# Patient Record
Sex: MALE | Race: WHITE | NOT HISPANIC OR LATINO | Employment: OTHER | URBAN - METROPOLITAN AREA
[De-identification: names, ages, dates, MRNs, and addresses within clinical notes are randomized per-mention and may not be internally consistent; named-entity substitution may affect disease eponyms.]

---

## 2017-06-27 ENCOUNTER — HOSPITAL ENCOUNTER (OUTPATIENT)
Dept: RADIOLOGY | Facility: HOSPITAL | Age: 82
Discharge: HOME | End: 2017-06-27
Attending: INTERNAL MEDICINE
Payer: MEDICARE

## 2017-06-27 DIAGNOSIS — R91.1 PULMONARY NODULE: ICD-10-CM

## 2017-06-27 DIAGNOSIS — E11.59 HYPERTENSION ASSOCIATED WITH DIABETES: Primary | ICD-10-CM

## 2017-06-27 DIAGNOSIS — I15.2 HYPERTENSION ASSOCIATED WITH DIABETES: Primary | ICD-10-CM

## 2017-06-27 DIAGNOSIS — E78.5 HYPERLIPIDEMIA: ICD-10-CM

## 2017-06-27 DIAGNOSIS — E78.5 HYPERLIPEMIA: ICD-10-CM

## 2017-06-27 PROCEDURE — 71260 CT THORAX DX C+: CPT | Mod: TC,PO

## 2017-06-27 PROCEDURE — 71260 CT THORAX DX C+: CPT | Mod: 26,,, | Performed by: RADIOLOGY

## 2017-06-27 PROCEDURE — 25500020 PHARM REV CODE 255: Mod: PO

## 2017-06-27 RX ADMIN — IOHEXOL 75 ML: 350 INJECTION, SOLUTION INTRAVENOUS at 02:06

## 2020-03-15 ENCOUNTER — HOSPITAL ENCOUNTER (EMERGENCY)
Facility: HOSPITAL | Age: 85
Discharge: HOME | End: 2020-03-15
Attending: EMERGENCY MEDICINE
Payer: MEDICARE

## 2020-03-15 VITALS
BODY MASS INDEX: 25.35 KG/M2 | OXYGEN SATURATION: 96 % | TEMPERATURE: 98 F | HEART RATE: 89 BPM | SYSTOLIC BLOOD PRESSURE: 177 MMHG | WEIGHT: 181.75 LBS | DIASTOLIC BLOOD PRESSURE: 76 MMHG | RESPIRATION RATE: 16 BRPM

## 2020-03-15 DIAGNOSIS — S41.111A LACERATION OF RIGHT UPPER EXTREMITY, INITIAL ENCOUNTER: Primary | ICD-10-CM

## 2020-03-15 PROCEDURE — 63600175 PHARM REV CODE 636 W HCPCS: Mod: ER | Performed by: EMERGENCY MEDICINE

## 2020-03-15 PROCEDURE — 90715 TDAP VACCINE 7 YRS/> IM: CPT | Mod: ER | Performed by: EMERGENCY MEDICINE

## 2020-03-15 PROCEDURE — 99284 EMERGENCY DEPT VISIT MOD MDM: CPT | Mod: 25,ER

## 2020-03-15 PROCEDURE — 12001 RPR S/N/AX/GEN/TRNK 2.5CM/<: CPT | Mod: ER

## 2020-03-15 PROCEDURE — 90471 IMMUNIZATION ADMIN: CPT | Mod: ER | Performed by: EMERGENCY MEDICINE

## 2020-03-15 RX ADMIN — CLOSTRIDIUM TETANI TOXOID ANTIGEN (FORMALDEHYDE INACTIVATED), CORYNEBACTERIUM DIPHTHERIAE TOXOID ANTIGEN (FORMALDEHYDE INACTIVATED), BORDETELLA PERTUSSIS TOXOID ANTIGEN (GLUTARALDEHYDE INACTIVATED), BORDETELLA PERTUSSIS FILAMENTOUS HEMAGGLUTININ ANTIGEN (FORMALDEHYDE INACTIVATED), BORDETELLA PERTUSSIS PERTACTIN ANTIGEN, AND BORDETELLA PERTUSSIS FIMBRIAE 2/3 ANTIGEN 0.5 ML: 5; 2; 2.5; 5; 3; 5 INJECTION, SUSPENSION INTRAMUSCULAR at 11:03

## 2020-03-15 NOTE — ED PROVIDER NOTES
Encounter Date: 3/15/2020       History     Chief Complaint   Patient presents with    Laceration     R upper arm and upper abd with scabbed superficial lacerations from driving lawnmower into jamin [employer] fence yesterday     The history is provided by the patient.   Laceration    The incident occurred just prior to arrival. The laceration is located on the right arm. The laceration is 2 cm in size. The laceration mechanism was a a metal edge. The pain has been constant since onset. He reports no foreign bodies present. His tetanus status is out of date.     Review of patient's allergies indicates:   Allergen Reactions    Bactrim [sulfamethoxazole-trimethoprim]     Penicillins      Past Medical History:   Diagnosis Date    Diabetes mellitus     Hypertension      Past Surgical History:   Procedure Laterality Date    carotid surgery      CORONARY ARTERY BYPASS GRAFT       History reviewed. No pertinent family history.  Social History     Tobacco Use    Smoking status: Never Smoker    Smokeless tobacco: Current User   Substance Use Topics    Alcohol use: Yes     Comment: occasional    Drug use: No     Review of Systems   Constitutional: Negative for fever.   HENT: Negative for sore throat.    Respiratory: Negative for shortness of breath.    Cardiovascular: Negative for chest pain.   Gastrointestinal: Negative for nausea.   Genitourinary: Negative for dysuria.   Musculoskeletal: Negative for back pain.   Skin: Negative for rash.   Neurological: Negative for weakness.   Hematological: Does not bruise/bleed easily.       Physical Exam     Initial Vitals [03/15/20 1106]   BP Pulse Resp Temp SpO2   (!) 177/76 89 16 97.5 °F (36.4 °C) 96 %      MAP       --         Physical Exam    Constitutional: He appears well-developed and well-nourished. No distress.   HENT:   Head: Normocephalic and atraumatic.   Eyes: Conjunctivae are normal. Pupils are equal, round, and reactive to light.   Neck: Normal range of motion. Neck  supple.   Cardiovascular: Normal rate, regular rhythm and normal heart sounds.   Pulmonary/Chest: Breath sounds normal.   Abdominal: Soft. Bowel sounds are normal.   Musculoskeletal: Normal range of motion.   Neurological: He is alert and oriented to person, place, and time. No cranial nerve deficit.   Skin: Skin is warm and dry.        Psychiatric: He has a normal mood and affect.         ED Course   Lac Repair  Date/Time: 3/15/2020 11:18 AM  Performed by: Arun Blackman MD  Authorized by: Arun Blackman MD   Body area: upper extremity  Location details: right upper arm  Laceration length: 2 cm  Foreign bodies: no foreign bodies  Tendon involvement: none  Nerve involvement: none  Irrigation solution: saline  Irrigation method: syringe  Amount of cleaning: standard  Debridement: none  Degree of undermining: none  Skin closure: glue  Patient tolerance: Patient tolerated the procedure well with no immediate complications        Labs Reviewed - No data to display       Imaging Results    None                                          Clinical Impression:       ICD-10-CM ICD-9-CM   1. Laceration of right upper extremity, initial encounter S41.111A 884.0           Disposition:   Disposition: Discharged  Condition: Stable     ED Disposition Condition    Discharge Stable        ED Prescriptions     None        Follow-up Information     Follow up With Specialties Details Why Contact Info    Todd Tim MD Internal Medicine In 2 days  [address]  PRIMARY [facility]ARE OF [facility] [address]  [phone]                                       Arun Blackman MD  03/15/20 8523

## 2022-09-20 ENCOUNTER — HOSPITAL ENCOUNTER (EMERGENCY)
Facility: HOSPITAL | Age: 87
Discharge: HOME | End: 2022-09-20
Attending: EMERGENCY MEDICINE
Payer: MEDICARE

## 2022-09-20 VITALS
SYSTOLIC BLOOD PRESSURE: 189 MMHG | DIASTOLIC BLOOD PRESSURE: 76 MMHG | HEIGHT: 70 IN | TEMPERATURE: 98 F | RESPIRATION RATE: 16 BRPM | BODY MASS INDEX: 25.33 KG/M2 | HEART RATE: 62 BPM | WEIGHT: 176.94 LBS | OXYGEN SATURATION: 99 %
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
## 2022-09-20 DIAGNOSIS — S20.219A CONTUSION OF CHEST WALL, UNSPECIFIED LATERALITY, INITIAL ENCOUNTER: ICD-10-CM

## 2022-09-20 PROCEDURE — 99284 EMERGENCY DEPT VISIT MOD MDM: CPT | Mod: 25,ER

## 2022-09-20 PROCEDURE — 93010 EKG 12-LEAD: ICD-10-PCS | Mod: ,,, | Performed by: INTERNAL MEDICINE

## 2022-09-20 PROCEDURE — 93010 ELECTROCARDIOGRAM REPORT: CPT | Mod: ,,, | Performed by: INTERNAL MEDICINE

## 2022-09-20 PROCEDURE — 93005 ELECTROCARDIOGRAM TRACING: CPT | Mod: ER

## 2022-09-20 RX ORDER — LATANOPROST 50 UG/ML
1 SOLUTION/ DROPS OPHTHALMIC NIGHTLY
COMMUNITY

## 2022-09-20 RX ORDER — METOPROLOL SUCCINATE 25 MG/1
12.5 TABLET, EXTENDED RELEASE ORAL DAILY
COMMUNITY
Start: 2022-08-30

## 2022-09-20 RX ORDER — ASPIRIN AND DIPYRIDAMOLE 25; 200 MG/1; MG/1
1 CAPSULE, EXTENDED RELEASE ORAL 2 TIMES DAILY
COMMUNITY

## 2022-09-20 RX ORDER — ASPIRIN 81 MG/1
81 TABLET ORAL DAILY
COMMUNITY
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
## 2023-09-03 ENCOUNTER — HOSPITAL ENCOUNTER (EMERGENCY)
Facility: HOSPITAL | Age: 88
Discharge: ANOTHER HOSPITAL | End: 2023-09-03
Attending: EMERGENCY MEDICINE
Payer: MEDICARE

## 2023-09-03 VITALS
TEMPERATURE: 98 F | DIASTOLIC BLOOD PRESSURE: 70 MMHG | SYSTOLIC BLOOD PRESSURE: 138 MMHG | OXYGEN SATURATION: 96 % | HEIGHT: 70 IN | RESPIRATION RATE: 24 BRPM | HEART RATE: 72 BPM | WEIGHT: 170.44 LBS | BODY MASS INDEX: 24.4 KG/M2

## 2023-09-03 DIAGNOSIS — R33.8 ACUTE URINARY RETENTION: Primary | ICD-10-CM

## 2023-09-03 DIAGNOSIS — R29.898 BILATERAL LEG WEAKNESS: ICD-10-CM

## 2023-09-03 LAB
ALBUMIN SERPL BCP-MCNC: 3.9 G/DL (ref 3.5–5.2)
ALP SERPL-CCNC: 54 U/L (ref 55–135)
ALT SERPL W/O P-5'-P-CCNC: 17 U/L (ref 10–44)
ANION GAP SERPL CALC-SCNC: 12 MMOL/L (ref 8–16)
AST SERPL-CCNC: 16 U/L (ref 10–40)
BASOPHILS # BLD AUTO: 0.02 K/UL (ref 0–0.2)
BASOPHILS NFR BLD: 0.4 % (ref 0–1.9)
BILIRUB SERPL-MCNC: 0.3 MG/DL (ref 0.1–1)
BILIRUB UR QL STRIP: NEGATIVE
BUN SERPL-MCNC: 21 MG/DL (ref 8–23)
CALCIUM SERPL-MCNC: 9.7 MG/DL (ref 8.7–10.5)
CHLORIDE SERPL-SCNC: 103 MMOL/L (ref 95–110)
CLARITY UR REFRACT.AUTO: CLEAR
CO2 SERPL-SCNC: 21 MMOL/L (ref 23–29)
COLOR UR AUTO: YELLOW
CREAT SERPL-MCNC: 0.9 MG/DL (ref 0.5–1.4)
CTP QC/QA: YES
DIFFERENTIAL METHOD: ABNORMAL
EOSINOPHIL # BLD AUTO: 0.1 K/UL (ref 0–0.5)
EOSINOPHIL NFR BLD: 1.7 % (ref 0–8)
ERYTHROCYTE [DISTWIDTH] IN BLOOD BY AUTOMATED COUNT: 13.9 % (ref 11.5–14.5)
EST. GFR  (NO RACE VARIABLE): >60 ML/MIN/1.73 M^2
GLUCOSE SERPL-MCNC: 123 MG/DL (ref 70–110)
GLUCOSE UR QL STRIP: NEGATIVE
HCT VFR BLD AUTO: 36.7 % (ref 40–54)
HEP C VIRUS HOLD SPECIMEN: NORMAL
HGB BLD-MCNC: 12.1 G/DL (ref 14–18)
HGB UR QL STRIP: NEGATIVE
IMM GRANULOCYTES # BLD AUTO: 0.03 K/UL (ref 0–0.04)
IMM GRANULOCYTES NFR BLD AUTO: 0.6 % (ref 0–0.5)
KETONES UR QL STRIP: NEGATIVE
LEUKOCYTE ESTERASE UR QL STRIP: NEGATIVE
LYMPHOCYTES # BLD AUTO: 1.6 K/UL (ref 1–4.8)
LYMPHOCYTES NFR BLD: 28.9 % (ref 18–48)
MAGNESIUM SERPL-MCNC: 1.4 MG/DL (ref 1.6–2.6)
MCH RBC QN AUTO: 30.9 PG (ref 27–31)
MCHC RBC AUTO-ENTMCNC: 33 G/DL (ref 32–36)
MCV RBC AUTO: 94 FL (ref 82–98)
MONOCYTES # BLD AUTO: 0.5 K/UL (ref 0.3–1)
MONOCYTES NFR BLD: 10.1 % (ref 4–15)
NEUTROPHILS # BLD AUTO: 3.1 K/UL (ref 1.8–7.7)
NEUTROPHILS NFR BLD: 58.3 % (ref 38–73)
NITRITE UR QL STRIP: NEGATIVE
NRBC BLD-RTO: 0 /100 WBC
PH UR STRIP: 6 [PH] (ref 5–8)
PLATELET # BLD AUTO: 146 K/UL (ref 150–450)
PMV BLD AUTO: 8.5 FL (ref 9.2–12.9)
POTASSIUM SERPL-SCNC: 4.3 MMOL/L (ref 3.5–5.1)
PROT SERPL-MCNC: 6.5 G/DL (ref 6–8.4)
PROT UR QL STRIP: NEGATIVE
RBC # BLD AUTO: 3.92 M/UL (ref 4.6–6.2)
SARS-COV-2 RDRP RESP QL NAA+PROBE: NEGATIVE
SODIUM SERPL-SCNC: 136 MMOL/L (ref 136–145)
SP GR UR STRIP: 1.01 (ref 1–1.03)
TROPONIN I SERPL DL<=0.01 NG/ML-MCNC: 0.02 NG/ML (ref 0–0.03)
TSH SERPL DL<=0.005 MIU/L-ACNC: 1.51 UIU/ML (ref 0.4–4)
URN SPEC COLLECT METH UR: NORMAL
UROBILINOGEN UR STRIP-ACNC: NEGATIVE EU/DL
WBC # BLD AUTO: 5.37 K/UL (ref 3.9–12.7)

## 2023-09-03 PROCEDURE — 51702 INSERT TEMP BLADDER CATH: CPT | Mod: ER

## 2023-09-03 PROCEDURE — 84443 ASSAY THYROID STIM HORMONE: CPT | Mod: ER | Performed by: EMERGENCY MEDICINE

## 2023-09-03 PROCEDURE — 86803 HEPATITIS C AB TEST: CPT | Performed by: EMERGENCY MEDICINE

## 2023-09-03 PROCEDURE — 96366 THER/PROPH/DIAG IV INF ADDON: CPT | Mod: 59,ER

## 2023-09-03 PROCEDURE — 83735 ASSAY OF MAGNESIUM: CPT | Mod: ER | Performed by: EMERGENCY MEDICINE

## 2023-09-03 PROCEDURE — 93005 ELECTROCARDIOGRAM TRACING: CPT | Mod: ER

## 2023-09-03 PROCEDURE — 81003 URINALYSIS AUTO W/O SCOPE: CPT | Mod: ER | Performed by: EMERGENCY MEDICINE

## 2023-09-03 PROCEDURE — 96365 THER/PROPH/DIAG IV INF INIT: CPT | Mod: ER

## 2023-09-03 PROCEDURE — 63600175 PHARM REV CODE 636 W HCPCS: Mod: ER | Performed by: EMERGENCY MEDICINE

## 2023-09-03 PROCEDURE — 87389 HIV-1 AG W/HIV-1&-2 AB AG IA: CPT | Performed by: EMERGENCY MEDICINE

## 2023-09-03 PROCEDURE — 87635 SARS-COV-2 COVID-19 AMP PRB: CPT | Mod: ER | Performed by: EMERGENCY MEDICINE

## 2023-09-03 PROCEDURE — 93010 ELECTROCARDIOGRAM REPORT: CPT | Mod: ,,, | Performed by: STUDENT IN AN ORGANIZED HEALTH CARE EDUCATION/TRAINING PROGRAM

## 2023-09-03 PROCEDURE — 85025 COMPLETE CBC W/AUTO DIFF WBC: CPT | Mod: ER | Performed by: EMERGENCY MEDICINE

## 2023-09-03 PROCEDURE — 80053 COMPREHEN METABOLIC PANEL: CPT | Mod: ER | Performed by: EMERGENCY MEDICINE

## 2023-09-03 PROCEDURE — 99285 EMERGENCY DEPT VISIT HI MDM: CPT | Mod: 25,ER

## 2023-09-03 PROCEDURE — 96361 HYDRATE IV INFUSION ADD-ON: CPT | Mod: ER

## 2023-09-03 PROCEDURE — 93010 EKG 12-LEAD: ICD-10-PCS | Mod: ,,, | Performed by: STUDENT IN AN ORGANIZED HEALTH CARE EDUCATION/TRAINING PROGRAM

## 2023-09-03 PROCEDURE — 84484 ASSAY OF TROPONIN QUANT: CPT | Mod: ER | Performed by: EMERGENCY MEDICINE

## 2023-09-03 RX ORDER — TAMSULOSIN HYDROCHLORIDE 0.4 MG/1
1 CAPSULE ORAL
COMMUNITY
Start: 2023-08-14

## 2023-09-03 RX ORDER — FINASTERIDE 5 MG/1
1 TABLET, FILM COATED ORAL DAILY
COMMUNITY
Start: 2023-05-17

## 2023-09-03 RX ORDER — MAGNESIUM SULFATE HEPTAHYDRATE 40 MG/ML
2 INJECTION, SOLUTION INTRAVENOUS
Status: COMPLETED | OUTPATIENT
Start: 2023-09-03 | End: 2023-09-03

## 2023-09-03 RX ORDER — DOXYCYCLINE 100 MG/1
100 CAPSULE ORAL 2 TIMES DAILY
COMMUNITY
Start: 2023-03-13

## 2023-09-03 RX ORDER — CLOBETASOL PROPIONATE 0.5 MG/G
1 CREAM TOPICAL 2 TIMES DAILY
COMMUNITY
Start: 2023-05-16

## 2023-09-03 RX ADMIN — MAGNESIUM SULFATE HEPTAHYDRATE 2 G: 40 INJECTION, SOLUTION INTRAVENOUS at 10:09

## 2023-09-03 RX ADMIN — SODIUM CHLORIDE, POTASSIUM CHLORIDE, SODIUM LACTATE AND CALCIUM CHLORIDE 500 ML: 600; 310; 30; 20 INJECTION, SOLUTION INTRAVENOUS at 09:09

## 2023-09-03 NOTE — ED PROVIDER NOTES
Encounter Date: 9/3/2023       History     Chief Complaint   Patient presents with    Fatigue     States fri noticed while walking leg bilateral weak and right arm with numbness interm     Patient is an 88-year-old male who presents to the emergency department today with a chief complaint of bilateral lower extremity weakness.  He reports for the last 2 days he has had intermittent weakness while standing.  He states that he feels like he is in his normal state of health whenever he is sitting or lying down, but whenever he stands up, he says that his legs wobble and feel as if they are about to give out.  He denies any falls.  He states he has always been able to sit down before they completely give out or he falls.  It's intermittent.  He states that this morning he was able to get up and go to the bathroom take a shower without difficulty, but later on when he was standing in the kitchen, his legs wobbly and weak again.  Denies any lightheadedness or dizziness.  Denies sensation of the room spinning or near-syncope.  Denies any chest pain, cough, shortness of breath, fever, urinary/bowel retention/incontinence.  The symptoms involve his bilateral lower extremities.  He reported at 1 point he felt as if his right upper extremity felt weak as well, but that is not currently present.  Denied any slurred speech vision changes.  Denies numbness/tingling.  Reports back pain with exertion or physical labor, but denies current back pain at rest sitting in the exam bed      Review of patient's allergies indicates:   Allergen Reactions    Bactrim [sulfamethoxazole-trimethoprim]     Penicillins      Past Medical History:   Diagnosis Date    Diabetes mellitus     Hypertension      Past Surgical History:   Procedure Laterality Date    carotid surgery      CORONARY ARTERY BYPASS GRAFT       No family history on file.  Social History     Tobacco Use    Smoking status: Never    Smokeless tobacco: Current   Substance Use Topics     Alcohol use: Yes     Comment: occasional    Drug use: No     Review of Systems   Neurological:  Positive for weakness (BLE intermittently upon standing).   All other systems reviewed and are negative.      Physical Exam     Initial Vitals [09/03/23 0903]   BP Pulse Resp Temp SpO2   (!) 148/70 106 20 97.8 °F (36.6 °C) 97 %      MAP       --         Physical Exam    Nursing note and vitals reviewed.  Constitutional: He appears well-developed and well-nourished. No distress.   HENT:   Head: Normocephalic and atraumatic.   Mouth/Throat: Oropharynx is clear and moist.   Eyes: Conjunctivae and EOM are normal. Pupils are equal, round, and reactive to light.   Neck: Neck supple. No tracheal deviation present.   Cardiovascular:  Normal rate, regular rhythm, normal heart sounds and intact distal pulses.           Pulmonary/Chest: Breath sounds normal. No respiratory distress.   Abdominal: Abdomen is soft. He exhibits no distension. There is no abdominal tenderness. There is no rebound and no guarding.   Musculoskeletal:         General: No tenderness or edema. Normal range of motion.      Cervical back: Neck supple.      Comments: No midline spinal ttp     Neurological: He is alert and oriented to person, place, and time. He has normal strength. No sensory deficit (No saddle anesthesia). GCS score is 15. GCS eye subscore is 4. GCS verbal subscore is 5. GCS motor subscore is 6.   No focal deficits. NIHSS 0   Skin: Skin is warm. No rash noted. No erythema.   Psychiatric: He has a normal mood and affect. His behavior is normal.         ED Course   Procedures  Labs Reviewed   COMPREHENSIVE METABOLIC PANEL - Abnormal; Notable for the following components:       Result Value    CO2 21 (*)     Glucose 123 (*)     Alkaline Phosphatase 54 (*)     All other components within normal limits   CBC W/ AUTO DIFFERENTIAL - Abnormal; Notable for the following components:    RBC 3.92 (*)     Hemoglobin 12.1 (*)     Hematocrit 36.7 (*)      Platelets 146 (*)     MPV 8.5 (*)     Immature Granulocytes 0.6 (*)     All other components within normal limits   MAGNESIUM - Abnormal; Notable for the following components:    Magnesium 1.4 (*)     All other components within normal limits   TROPONIN I   TSH   URINALYSIS, REFLEX TO URINE CULTURE    Narrative:     Specimen Source->Urine   HIV 1 / 2 ANTIBODY   HEPATITIS C ANTIBODY   HEP C VIRUS HOLD SPECIMEN   SARS-COV-2 RDRP GENE     Results for orders placed or performed during the hospital encounter of 09/03/23   Comprehensive metabolic panel   Result Value Ref Range    Sodium 136 136 - 145 mmol/L    Potassium 4.3 3.5 - 5.1 mmol/L    Chloride 103 95 - 110 mmol/L    CO2 21 (L) 23 - 29 mmol/L    Glucose 123 (H) 70 - 110 mg/dL    BUN 21 8 - 23 mg/dL    Creatinine 0.9 0.5 - 1.4 mg/dL    Calcium 9.7 8.7 - 10.5 mg/dL    Total Protein 6.5 6.0 - 8.4 g/dL    Albumin 3.9 3.5 - 5.2 g/dL    Total Bilirubin 0.3 0.1 - 1.0 mg/dL    Alkaline Phosphatase 54 (L) 55 - 135 U/L    AST 16 10 - 40 U/L    ALT 17 10 - 44 U/L    eGFR >60.0 >60 mL/min/1.73 m^2    Anion Gap 12 8 - 16 mmol/L   CBC auto differential   Result Value Ref Range    WBC 5.37 3.90 - 12.70 K/uL    RBC 3.92 (L) 4.60 - 6.20 M/uL    Hemoglobin 12.1 (L) 14.0 - 18.0 g/dL    Hematocrit 36.7 (L) 40.0 - 54.0 %    MCV 94 82 - 98 fL    MCH 30.9 27.0 - 31.0 pg    MCHC 33.0 32.0 - 36.0 g/dL    RDW 13.9 11.5 - 14.5 %    Platelets 146 (L) 150 - 450 K/uL    MPV 8.5 (L) 9.2 - 12.9 fL    Immature Granulocytes 0.6 (H) 0.0 - 0.5 %    Gran # (ANC) 3.1 1.8 - 7.7 K/uL    Immature Grans (Abs) 0.03 0.00 - 0.04 K/uL    Lymph # 1.6 1.0 - 4.8 K/uL    Mono # 0.5 0.3 - 1.0 K/uL    Eos # 0.1 0.0 - 0.5 K/uL    Baso # 0.02 0.00 - 0.20 K/uL    nRBC 0 0 /100 WBC    Gran % 58.3 38.0 - 73.0 %    Lymph % 28.9 18.0 - 48.0 %    Mono % 10.1 4.0 - 15.0 %    Eosinophil % 1.7 0.0 - 8.0 %    Basophil % 0.4 0.0 - 1.9 %    Differential Method Automated    Troponin I   Result Value Ref Range    Troponin I 0.023  0.000 - 0.026 ng/mL   TSH   Result Value Ref Range    TSH 1.510 0.400 - 4.000 uIU/mL   Urinalysis, Reflex to Urine Culture Urine, Clean Catch    Specimen: Urine   Result Value Ref Range    Specimen UA Urine, Clean Catch     Color, UA Yellow Yellow, Straw, Luann    Appearance, UA Clear Clear    pH, UA 6.0 5.0 - 8.0    Specific Gravity, UA 1.010 1.005 - 1.030    Protein, UA Negative Negative    Glucose, UA Negative Negative    Ketones, UA Negative Negative    Bilirubin (UA) Negative Negative    Occult Blood UA Negative Negative    Nitrite, UA Negative Negative    Urobilinogen, UA Negative <2.0 EU/dL    Leukocytes, UA Negative Negative   Magnesium   Result Value Ref Range    Magnesium 1.4 (L) 1.6 - 2.6 mg/dL   POCT COVID-19 Rapid Screening   Result Value Ref Range    POC Rapid COVID Negative Negative     Acceptable Yes             Imaging Results              X-Ray Chest AP Portable (Final result)  Result time 09/03/23 10:04:07      Final result by Yuriy Frazier MD (09/03/23 10:04:07)                   Impression:      No acute findings.      Electronically signed by: Yuriy Frazier MD  Date:    09/03/2023  Time:    10:04               Narrative:    EXAMINATION:  XR CHEST AP PORTABLE    CLINICAL HISTORY:  Coronary artery disease., Generalized weakness;    COMPARISON:  09/20/2022 x-ray.    FINDINGS:  Sternal wires and mediastinal clips are again noted.    The heart size is nonenlarged    Small left suprahilar granuloma again noted.    No acute infiltrate.                                      EKG reviewed interpreted by ED provider as normal sinus rhythm with a rate 84, normal axis, normal intervals, normal ST-T segments     Medications   magnesium sulfate 2g in water 50mL IVPB (premix) (2 g Intravenous New Bag 9/3/23 1048)   lactated ringers bolus 500 mL (0 mLs Intravenous Stopped 9/3/23 1019)     Medical Decision Making  88-year-old who presented with reports of bilateral lower extremity  weakness for 2 days.  Reports last week he was able to ambulate without difficulty and without assistance.  On exam, patient does appear to have good strength, but he reports multiple episodes where he nearly fell at home.  Bladder scan showed greater than 1 L. patient denied any sensation of bladder fullness or suprapubic discomfort.  Doyle catheter placed.  At this point with his urinary retention and reports of bilateral lower extremity weakness for 2 days, I believe patient needs an MRI to rule out any spinal cord compression.  We do not currently have MRI capabilities at this facility at this hour.  Patient voiced understanding of the plan of care including the need for transfer for MRI services.  Patient requested [facility]. Patient accepted by Dr. De Souza at [facility].  Patient will be transferred via [facility] with IV magnesium EN route for his hypomagnesemia    Amount and/or Complexity of Data Reviewed  External Data Reviewed: notes.     Details: Past medical history reviewed.  Medication list reviewed.  Recent outpatient clinic visits reviewed.  Labs: ordered. Decision-making details documented in ED Course.  Radiology: ordered. Decision-making details documented in ED Course.  ECG/medicine tests: ordered and independent interpretation performed. Decision-making details documented in ED Course.    Risk  Prescription drug management.                               Clinical Impression:   Final diagnoses:  [R33.8] Acute urinary retention (Primary)  [R29.898] Bilateral leg weakness        ED Disposition Condition    Transfer to Another Facility Stable                Eduardo Cardenas MD  09/03/23 1226       Eduardo Cardenas MD  09/03/23 8470

## 2023-09-04 LAB
HCV AB SERPL QL IA: NEGATIVE
HIV 1+2 AB+HIV1 P24 AG SERPL QL IA: NEGATIVE

## 2023-09-06 ENCOUNTER — HOSPITAL ENCOUNTER (EMERGENCY)
Facility: HOSPITAL | Age: 88
Discharge: HOME | End: 2023-09-06
Attending: EMERGENCY MEDICINE
Payer: MEDICARE

## 2023-09-06 VITALS
RESPIRATION RATE: 20 BRPM | DIASTOLIC BLOOD PRESSURE: 79 MMHG | OXYGEN SATURATION: 97 % | BODY MASS INDEX: 23.66 KG/M2 | HEART RATE: 118 BPM | TEMPERATURE: 98 F | WEIGHT: 164.88 LBS | SYSTOLIC BLOOD PRESSURE: 128 MMHG

## 2023-09-06 DIAGNOSIS — T83.9XXA FOLEY CATHETER PROBLEM, INITIAL ENCOUNTER: Primary | ICD-10-CM

## 2023-09-06 PROCEDURE — 99281 EMR DPT VST MAYX REQ PHY/QHP: CPT | Mod: ER

## 2023-09-06 NOTE — ED PROVIDER NOTES
Encounter Date: 9/6/2023       History     Chief Complaint   Patient presents with    Catheter issue     Catheter bag is leaking     Has had a kerr catheter for the last several days.  The bag he has started leaking today.  No problems with the actual catheter.    The history is provided by the patient.     Review of patient's allergies indicates:   Allergen Reactions    Bactrim [sulfamethoxazole-trimethoprim]     Penicillins      Past Medical History:   Diagnosis Date    Diabetes mellitus     Hypertension      Past Surgical History:   Procedure Laterality Date    carotid surgery      CORONARY ARTERY BYPASS GRAFT       No family history on file.  Social History     Tobacco Use    Smoking status: Never    Smokeless tobacco: Current   Substance Use Topics    Alcohol use: Yes     Comment: occasional    Drug use: No     Review of Systems   Constitutional:  Negative for fever.   HENT:  Negative for sore throat.    Respiratory:  Negative for shortness of breath.    Cardiovascular:  Negative for chest pain.   Gastrointestinal:  Negative for nausea.   Genitourinary:  Negative for dysuria.   Musculoskeletal:  Negative for back pain.   Skin:  Negative for rash.   Neurological:  Negative for weakness.   Hematological:  Does not bruise/bleed easily.       Physical Exam     Initial Vitals [09/06/23 1303]   BP Pulse Resp Temp SpO2   128/79 (!) 118 20 98.1 °F (36.7 °C) 97 %      MAP       --         Physical Exam    Nursing note and vitals reviewed.  Constitutional: He appears well-developed and well-nourished. No distress.   HENT:   Head: Normocephalic and atraumatic.   Mouth/Throat: Oropharynx is clear and moist.   Eyes: Conjunctivae and EOM are normal. Pupils are equal, round, and reactive to light.   Neck: Neck supple.   Normal range of motion.  Cardiovascular:  Normal rate, regular rhythm and normal heart sounds.     Exam reveals no gallop and no friction rub.       No murmur heard.  Pulmonary/Chest: Breath sounds normal. No  respiratory distress. He has no wheezes. He has no rhonchi. He has no rales.   Abdominal: Abdomen is soft. Bowel sounds are normal. He exhibits no distension and no mass. There is no abdominal tenderness. There is no rebound and no guarding.   Genitourinary:    Genitourinary Comments: Kerr in place     Musculoskeletal:         General: No edema. Normal range of motion.      Cervical back: Normal range of motion and neck supple.     Neurological: He is alert and oriented to person, place, and time. He has normal strength.   Skin: Skin is warm and dry. No rash noted.   Psychiatric: He has a normal mood and affect. Thought content normal.         ED Course   Procedures  Labs Reviewed - No data to display       Imaging Results    None          Medications - No data to display  Medical Decision Making  Kerr bag started leaking.  DDX: kerr problem    Problems Addressed:  Kerr catheter problem, initial encounter: acute illness or injury    Amount and/or Complexity of Data Reviewed  Discussion of management or test interpretation with external provider(s): Kerr bag replaced.  Ready for discahrge                               Clinical Impression:   Final diagnoses:  [T83.9XXA] Kerr catheter problem, initial encounter (Primary)        ED Disposition Condition    Discharge Stable          ED Prescriptions    None       Follow-up Information       Follow up With Specialties Details Why Contact Info    Todd Tim MD Internal Medicine   [address] [place] [address]  PRIMARY CARE OF [facility] [address]  [phone]               Arun Blackman MD  09/06/23 4353

## 2023-09-10 ENCOUNTER — HOSPITAL ENCOUNTER (EMERGENCY)
Facility: HOSPITAL | Age: 88
Discharge: HOME | End: 2023-09-10
Attending: EMERGENCY MEDICINE
Payer: MEDICARE

## 2023-09-10 VITALS
BODY MASS INDEX: 23.9 KG/M2 | WEIGHT: 166.56 LBS | TEMPERATURE: 98 F | OXYGEN SATURATION: 98 % | HEART RATE: 91 BPM | DIASTOLIC BLOOD PRESSURE: 67 MMHG | SYSTOLIC BLOOD PRESSURE: 149 MMHG | RESPIRATION RATE: 20 BRPM

## 2023-09-10 DIAGNOSIS — R33.9 URINARY RETENTION: ICD-10-CM

## 2023-09-10 DIAGNOSIS — N30.00 ACUTE CYSTITIS WITHOUT HEMATURIA: Primary | ICD-10-CM

## 2023-09-10 LAB
BACTERIA #/AREA URNS AUTO: ABNORMAL /HPF
BILIRUB UR QL STRIP: NEGATIVE
CLARITY UR REFRACT.AUTO: CLEAR
COLOR UR AUTO: YELLOW
GLUCOSE UR QL STRIP: NEGATIVE
HGB UR QL STRIP: NEGATIVE
KETONES UR QL STRIP: NEGATIVE
LEUKOCYTE ESTERASE UR QL STRIP: NEGATIVE
MICROSCOPIC COMMENT: ABNORMAL
NITRITE UR QL STRIP: POSITIVE
PH UR STRIP: 7 [PH] (ref 5–8)
PROT UR QL STRIP: NEGATIVE
SP GR UR STRIP: 1.01 (ref 1–1.03)
URN SPEC COLLECT METH UR: ABNORMAL
UROBILINOGEN UR STRIP-ACNC: NEGATIVE EU/DL

## 2023-09-10 PROCEDURE — 81000 URINALYSIS NONAUTO W/SCOPE: CPT | Mod: ER | Performed by: EMERGENCY MEDICINE

## 2023-09-10 PROCEDURE — 51702 INSERT TEMP BLADDER CATH: CPT | Mod: ER

## 2023-09-10 PROCEDURE — 51798 US URINE CAPACITY MEASURE: CPT | Mod: ER

## 2023-09-10 PROCEDURE — 99283 EMERGENCY DEPT VISIT LOW MDM: CPT | Mod: ER

## 2023-09-10 RX ORDER — CIPROFLOXACIN 500 MG/1
500 TABLET ORAL EVERY 12 HOURS
Qty: 20 TABLET | Refills: 0 | Status: SHIPPED | OUTPATIENT
Start: 2023-09-10 | End: 2023-09-20

## 2023-09-10 RX ORDER — CIPROFLOXACIN 750 MG/1
750 TABLET, FILM COATED ORAL
Status: DISCONTINUED | OUTPATIENT
Start: 2023-09-10 | End: 2023-09-10 | Stop reason: HOSPADM

## 2023-09-10 NOTE — ED PROVIDER NOTES
Emergency Medicine Provider Note - 9/10/2023    SCRIBE #1 NOTE: I, Roma Araiza, am scribing for, and in the presence of, No att. providers found. I have scribed the entire note.       History     Chief Complaint   Patient presents with    Urinary Retention     States pee a little this morning and not much yesterday. States had cath in last Sunday when seen in ER. States cath was removed Thursday by Dr. Gaffney. States urinated good but was told still had urine left. [facility] next appointment will be 25th of Sept.           History of Present Illness   HPI    9/10/2023, 9:24 AM  The history is provided by the patient    Janes Nesbitt Jr. is a 88 y.o. male presenting to the ED for urinary retention.  Patient has history of urinary retention.  Patient had a catheterization placed September 3rd.  He followed up with his primary care physician.  Patient had MRI performed.  Patient followed up with his urologist.  Doyle catheter was removed on Thursday.  Patient reports decreased urination    MRI   Narrative    MRI LUMBAR SPINE W WO CONTRAST     CLINICAL INDICATION: Myelopathy,  acute,  lumbar spine     COMPARISON: Correlation with CT 9/3/2023     TECHNIQUE: Multiplanar multisequence magnetic resonance imaging of the lumbar spine was performed including post gadolinium sequences.     FINDINGS: Lumbar vertebral body height and alignment are preserved. There is no abnormal marrow or cord signal. The conus medullaris terminates at T12-L1. There are no foci of abnormal enhancement. Individual intervertebral levels will be described below:     L1/2: Normal.     L2/3: Mild broad-based posterior disc bulge. Mild bilateral neuroforaminal stenosis.     L3/4: Tiny posterior annular fissure. Broad-based posterior disc bulge, bilateral ligamentum flavum hypertrophy and facet osteoarthritis. Mild bilateral neuroforaminal stenosis.     L4/5: Posterior annular fissure. Broad-based posterior disc bulge, bilateral ligamentum flavum  hypertrophy and facet osteoarthritis. Moderate central canal stenosis, narrowing of the left greater than right lateral recesses. Severe right and moderate left neuroforaminal stenosis.     L5/S1: Broad-based posterior disc bulge, posterior annular fissure. Bilateral facet osteoarthritis. Mild right and moderate left neuroforaminal stenosis.  Exam End: 09/04/23 07:51 Last Resulted: 09/04/23 08:56   Received From: [facility]  Result Received: 09/06/23 12:58       Arrival mode:  Personal Vehicle      PCP: Todd Tim MD     Allergies:  Review of patient's allergies indicates:   Allergen Reactions    Clopidogrel      Causes diarrhea     Penicillin v Hives    Penicillins     Sulfamethoxazole-trimethoprim Hives       Past Medical History:  Past Medical History:   Diagnosis Date    Diabetes mellitus     Hypertension        Past Surgical History:  Past Surgical History:   Procedure Laterality Date    carotid surgery      CORONARY ARTERY BYPASS GRAFT           Family History:  History reviewed. No pertinent family history.    Social History:  Social History     Tobacco Use    Smoking status: Never    Smokeless tobacco: Current   Substance and Sexual Activity    Alcohol use: Yes     Comment: occasional    Drug use: No    Sexual activity: Not on file       Triage note, Allergies, Past Medical History, Past Surgical History, Family History and Social History reviewed as documented above.     Review of Systems   Review of Systems   Constitutional:  Negative for fever.   HENT:  Negative for sore throat.    Respiratory:  Negative for shortness of breath.    Cardiovascular:  Negative for chest pain.   Gastrointestinal:  Negative for nausea and vomiting.   Genitourinary:  Positive for difficulty urinating.   Musculoskeletal:  Negative for back pain.   Skin:  Negative for rash.   Neurological:  Negative for weakness.   Hematological:  Does not  bruise/bleed easily.          Physical Exam     Initial Vitals [09/10/23 0911]   BP Pulse Resp Temp SpO2   (!) 149/67 91 20 97.7 °F (36.5 °C) 98 %      MAP       --          Physical Exam    Nursing Notes and Vital Signs Reviewed.  Constitutional: Patient is in no apparent distress. Well-developed and well-nourished.  Head: Atraumatic. Normocephalic.  Eyes: PERRL. EOM intact. Conjunctivae are not pale. No scleral icterus.  ENT: Mucous membranes are moist. Oropharynx is clear and symmetric.    Neck: Supple. Full ROM. No lymphadenopathy.  Cardiovascular: Regular rate. Regular rhythm. No murmurs, rubs, or gallops. Distal pulses are 2+ and symmetric.  Pulmonary/Chest: No respiratory distress. Clear to auscultation bilaterally. No wheezing or rales.  Abdominal: Soft and non-distended.  There is no tenderness.  No rebound, guarding, or rigidity. Good bowel sounds.  Genitourinary: No CVA tenderness, no scrotal swelling.  Normal testicular lie  Musculoskeletal: Moves all extremities. No obvious deformities. No edema. No calf tenderness.  Skin: Warm and dry.  Neurological:  Alert, awake, and appropriate.  Normal speech.  No acute focal neurological deficits are appreciated.  Psychiatric: Normal affect. Good eye contact. Appropriate in content.     ED Course     ED Procedures:  Procedures    ED Vital Signs:  Vitals:    09/10/23 0911   BP: (!) 149/67   Pulse: 91   Resp: 20   Temp: 97.7 °F (36.5 °C)   TempSrc: Oral   SpO2: 98%   Weight: 75.5 kg (166 lb 8.9 oz)       Abnormal Lab Results:  Labs Reviewed   URINALYSIS, REFLEX TO URINE CULTURE - Abnormal; Notable for the following components:       Result Value    Nitrite, UA Positive (*)     All other components within normal limits    Narrative:     Specimen Source->Urine   URINALYSIS MICROSCOPIC - Abnormal; Notable for the following components:    Bacteria Moderate (*)     All other components within normal limits    Narrative:     Specimen Source->Urine        All Lab  Results:  Results for orders placed or performed during the hospital encounter of 09/10/23   Urinalysis, Reflex to Urine Culture Urine, Catheterized    Specimen: Urine   Result Value Ref Range    Specimen UA Urine, Catheterized     Color, UA Yellow Yellow, Straw, Luann    Appearance, UA Clear Clear    pH, UA 7.0 5.0 - 8.0    Specific Gravity, UA 1.010 1.005 - 1.030    Protein, UA Negative Negative    Glucose, UA Negative Negative    Ketones, UA Negative Negative    Bilirubin (UA) Negative Negative    Occult Blood UA Negative Negative    Nitrite, UA Positive (A) Negative    Urobilinogen, UA Negative <2.0 EU/dL    Leukocytes, UA Negative Negative   Urinalysis Microscopic   Result Value Ref Range    Bacteria Moderate (A) None-Occ /hpf    Microscopic Comment SEE COMMENT            Imaging Results:  Imaging Results    None               The Emergency Provider reviewed the vital signs and test results, which are outlined above.     ED Discussion     ED Course as of 09/10/23 1331   Sun Sep 10, 2023   0924 Bladder scanner 700 mL [LB]   1004 Bacteria, UA(!): Moderate [LB]   1005 NITRITE UA(!): Positive [LB]   1006 Postvoid residual over 600 [LB]      ED Course User Index  [LB] Roma Araiza, DO            10:39  Reassessment: Dr. Araiza reassessed the pt.  The pt is resting comfortably and is NAD.  Pt states their sx have improved. Discussed test results, shared treatment plan, specific conditions for return, and the need for f/u.  Answered their questions at this time.  Pt understands and agrees to the plan.  The pt has remained hemodynamically stable through ED course and is stable for discharge.      I discussed with patient and/or family/caretaker that evaluation in the ED does not suggest any emergent or life threatening medical conditions requiring immediate intervention beyond what was provided in the ED, and I believe patient is safe for discharge.  Regardless, an unremarkable evaluation in the ED does not  preclude the development or presence of a serious of life threatening condition. As such, patient was instructed to return immediately for any worsening or change in current symptoms.      ED Medication(s):  Medications - No data to display      Medical Decision Making   Medical Decision Making  Patient with history of recent urinary retention requiring Doyle catheter placement.  Catheter removed 4 days prior by his urologist, Dr. Gaffney.  Outpatient MRI does not show central cord stenosis.  Patient comes in today complaining of decreased urination that started yesterday.  No fever, nausea, vomiting, chills, weakness of legs.    Differential diagnosis includes: Urinary tract infection, urinary retention    Bladder scanner showed greater than 770 mL of urine.  Doyle catheter was placed.  Urinalysis positive for bacteria and nitrates.  Greater than 600 cc drained with Doyle catheter.    Prescription for Cipro.      Amount and/or Complexity of Data Reviewed  External Data Reviewed: labs, radiology and notes.     Details: 09/04/23 06:29  BUN: 17 (E)  Creatinine: 0.83 (E)      (E): External lab result      Labs: ordered. Decision-making details documented in ED Course.    Risk  Prescription drug management.          Discussed case with:N/A         Scribe Attestation:   Scribe #1: I performed the above scribed service and the documentation accurately describes the services I performed. I attest to the accuracy of the note.    Attending:   Physician Attestation Statement for Scribe #1: I, No att. providers found, personally performed the services described in this documentation, as scribed by Roma Araiza, in my presence, and it is both accurate and complete.           MIPS Measures     Smoker? No     Hypertension: History of Hypertension: The patient has elevated blood pressure (higher than 120/80) while being treated in the ED but has a history of hypertension.      Portions of this note may have been created with  "voice recognition software. Occasional "wrong-word" or "sound-a-like" substitutions may have occurred due to the inherent limitations of voice recognition software. Please, read the note carefully and recognize, using context, where substitutions have occurred.            Clinical Impression       ICD-10-CM ICD-9-CM   1. Acute cystitis without hematuria  N30.00 595.0   2. Urinary retention  R33.9 788.20         ED Disposition       Disposition: Discharge to home  Patient condition: Good    Medication List     Medication List        CHANGE how you take these medications      ciprofloxacin HCl 500 MG tablet  Commonly known as: CIPRO  Take 1 tablet (500 mg total) by mouth every 12 (twelve) hours. for 10 days  What changed:   medication strength  how much to take  when to take this            ASK your doctor about these medications      * aspirin 325 MG EC tablet  Commonly known as: ECOTRIN     * aspirin 81 MG EC tablet  Commonly known as: ECOTRIN     benazepriL 5 MG tablet  Commonly known as: LOTENSIN     clobetasoL 0.05 % cream  Commonly known as: TEMOVATE     clonazePAM 0.5 MG tablet  Commonly known as: KlonoPIN     dipyridamole-aspirin 200-25 mg  mg Cm12  Commonly known as: AGGRENOX     doxycycline 100 MG capsule  Commonly known as: MONODOX     finasteride 5 mg tablet  Commonly known as: PROSCAR     latanoprost 0.005 % ophthalmic solution     metFORMIN 500 MG tablet  Commonly known as: GLUCOPHAGE     metoprolol succinate 25 MG 24 hr tablet  Commonly known as: TOPROL-XL     SIMVASTATIN ORAL     tamsulosin 0.4 mg Cap  Commonly known as: FLOMAX           * This list has 2 medication(s) that are the same as other medications prescribed for you. Read the directions carefully, and ask your doctor or other care provider to review them with you.                   Where to Get Your Medications        These medications were sent to [facility] [address] [place] - [address] [address]  [address] [address], " [place] [address]      Phone: [phone]   ciprofloxacin HCl 500 MG tablet         ED Follow-up   Follow-up Information       Your urologist. Call in 2 days.    Why: Return to emergency department for fever, nausea, vomiting, severe abdominal pain, or other concerns                                    Roma Araiza,   09/10/23 1335

## 2023-09-10 NOTE — PROGRESS NOTES
Pharmacist Renal Dose Adjustment Note    Janes Nesbitt Jr. is a 88 y.o. male being treated with the medication ciprofloxacin.     Patient Data:    Vital Signs (Most Recent):  Temp: 97.7 °F (36.5 °C) (09/10/23 0911)  Pulse: 91 (09/10/23 0911)  Resp: 20 (09/10/23 0911)  BP: (!) 149/67 (09/10/23 0911)  SpO2: 98 % (09/10/23 0911) Vital Signs (72h Range):  Temp:  [97.7 °F (36.5 °C)]   Pulse:  [91]   Resp:  [20]   BP: (149)/(67)   SpO2:  [98 %]      Recent Labs   Lab 09/04/23 0629   CREATININE 0.83     Serum creatinine: 0.83 mg/dL 09/04/23 0629  Estimated creatinine clearance: 66 mL/min    Ciprofloxacin 500 mg oral x1 dose will be changed to ciprofloxacin 750 mg oral x1 dose per renal dose protocol for CrCl > 30 ml/min.     Thank you,  Pharmacist's Name: Luciano Lizama

## 2023-09-25 ENCOUNTER — HOSPITAL ENCOUNTER (INPATIENT)
Facility: HOSPITAL | Age: 88
LOS: 1 days | Discharge: HOME | DRG: 065 | End: 2023-09-26
Attending: EMERGENCY MEDICINE | Admitting: HOSPITALIST
Payer: MEDICARE

## 2023-09-25 DIAGNOSIS — R53.1 RIGHT SIDED WEAKNESS: Primary | ICD-10-CM

## 2023-09-25 DIAGNOSIS — I63.9 STROKE: ICD-10-CM

## 2023-09-25 DIAGNOSIS — E11.9 TYPE 2 DIABETES MELLITUS WITHOUT COMPLICATION, WITHOUT LONG-TERM CURRENT USE OF INSULIN: ICD-10-CM

## 2023-09-25 DIAGNOSIS — N13.8 BPH WITH OBSTRUCTION/LOWER URINARY TRACT SYMPTOMS: ICD-10-CM

## 2023-09-25 DIAGNOSIS — N40.1 BPH WITH OBSTRUCTION/LOWER URINARY TRACT SYMPTOMS: ICD-10-CM

## 2023-09-25 DIAGNOSIS — I63.9 CVA (CEREBRAL VASCULAR ACCIDENT): ICD-10-CM

## 2023-09-25 DIAGNOSIS — I63.9 ACUTE CVA (CEREBROVASCULAR ACCIDENT): ICD-10-CM

## 2023-09-25 LAB
ALBUMIN SERPL BCP-MCNC: 4.3 G/DL (ref 3.5–5.2)
ALP SERPL-CCNC: 63 U/L (ref 55–135)
ALT SERPL W/O P-5'-P-CCNC: 16 U/L (ref 10–44)
ANION GAP SERPL CALC-SCNC: 12 MMOL/L (ref 8–16)
AST SERPL-CCNC: 14 U/L (ref 10–40)
BASOPHILS # BLD AUTO: 0.02 K/UL (ref 0–0.2)
BASOPHILS NFR BLD: 0.3 % (ref 0–1.9)
BILIRUB SERPL-MCNC: 0.4 MG/DL (ref 0.1–1)
BILIRUB UR QL STRIP: NEGATIVE
BNP SERPL-MCNC: 27 PG/ML (ref 0–99)
BUN SERPL-MCNC: 22 MG/DL (ref 8–23)
CALCIUM SERPL-MCNC: 9.9 MG/DL (ref 8.7–10.5)
CHLORIDE SERPL-SCNC: 102 MMOL/L (ref 95–110)
CLARITY UR: CLEAR
CO2 SERPL-SCNC: 25 MMOL/L (ref 23–29)
COLOR UR: YELLOW
CREAT SERPL-MCNC: 0.9 MG/DL (ref 0.5–1.4)
DIFFERENTIAL METHOD: ABNORMAL
EOSINOPHIL # BLD AUTO: 0.1 K/UL (ref 0–0.5)
EOSINOPHIL NFR BLD: 1.7 % (ref 0–8)
ERYTHROCYTE [DISTWIDTH] IN BLOOD BY AUTOMATED COUNT: 13.5 % (ref 11.5–14.5)
EST. GFR  (NO RACE VARIABLE): >60 ML/MIN/1.73 M^2
GLUCOSE SERPL-MCNC: 112 MG/DL (ref 70–110)
GLUCOSE UR QL STRIP: NEGATIVE
HCT VFR BLD AUTO: 38.9 % (ref 40–54)
HGB BLD-MCNC: 12.9 G/DL (ref 14–18)
HGB UR QL STRIP: NEGATIVE
HYALINE CASTS #/AREA URNS LPF: 1 /LPF
IMM GRANULOCYTES # BLD AUTO: 0.02 K/UL (ref 0–0.04)
IMM GRANULOCYTES NFR BLD AUTO: 0.3 % (ref 0–0.5)
INR PPP: 1 (ref 0.8–1.2)
KETONES UR QL STRIP: NEGATIVE
LEUKOCYTE ESTERASE UR QL STRIP: ABNORMAL
LYMPHOCYTES # BLD AUTO: 1.6 K/UL (ref 1–4.8)
LYMPHOCYTES NFR BLD: 27.3 % (ref 18–48)
MCH RBC QN AUTO: 31.2 PG (ref 27–31)
MCHC RBC AUTO-ENTMCNC: 33.2 G/DL (ref 32–36)
MCV RBC AUTO: 94 FL (ref 82–98)
MICROSCOPIC COMMENT: ABNORMAL
MONOCYTES # BLD AUTO: 0.5 K/UL (ref 0.3–1)
MONOCYTES NFR BLD: 8.2 % (ref 4–15)
NEUTROPHILS # BLD AUTO: 3.7 K/UL (ref 1.8–7.7)
NEUTROPHILS NFR BLD: 62.2 % (ref 38–73)
NITRITE UR QL STRIP: NEGATIVE
NRBC BLD-RTO: 0 /100 WBC
PH UR STRIP: 7 [PH] (ref 5–8)
PLATELET # BLD AUTO: 164 K/UL (ref 150–450)
PMV BLD AUTO: 9.1 FL (ref 9.2–12.9)
POTASSIUM SERPL-SCNC: 4.4 MMOL/L (ref 3.5–5.1)
PROT SERPL-MCNC: 7.1 G/DL (ref 6–8.4)
PROT UR QL STRIP: NEGATIVE
PROTHROMBIN TIME: 11 SEC (ref 9–12.5)
RBC # BLD AUTO: 4.14 M/UL (ref 4.6–6.2)
RBC #/AREA URNS HPF: 0 /HPF (ref 0–4)
SODIUM SERPL-SCNC: 139 MMOL/L (ref 136–145)
SP GR UR STRIP: 1.03 (ref 1–1.03)
TROPONIN I SERPL DL<=0.01 NG/ML-MCNC: <0.006 NG/ML (ref 0–0.03)
TSH SERPL DL<=0.005 MIU/L-ACNC: 1.22 UIU/ML (ref 0.4–4)
URN SPEC COLLECT METH UR: ABNORMAL
UROBILINOGEN UR STRIP-ACNC: NEGATIVE EU/DL
WBC # BLD AUTO: 5.94 K/UL (ref 3.9–12.7)
WBC #/AREA URNS HPF: 10 /HPF (ref 0–5)

## 2023-09-25 PROCEDURE — 81000 URINALYSIS NONAUTO W/SCOPE: CPT | Performed by: NURSE PRACTITIONER

## 2023-09-25 PROCEDURE — 80061 LIPID PANEL: CPT | Performed by: EMERGENCY MEDICINE

## 2023-09-25 PROCEDURE — 25000003 PHARM REV CODE 250: Performed by: NURSE PRACTITIONER

## 2023-09-25 PROCEDURE — 93010 EKG 12-LEAD: ICD-10-PCS | Mod: ,,, | Performed by: INTERNAL MEDICINE

## 2023-09-25 PROCEDURE — 84443 ASSAY THYROID STIM HORMONE: CPT | Mod: ER | Performed by: EMERGENCY MEDICINE

## 2023-09-25 PROCEDURE — 63600175 PHARM REV CODE 636 W HCPCS: Performed by: NURSE PRACTITIONER

## 2023-09-25 PROCEDURE — 21400001 HC TELEMETRY ROOM

## 2023-09-25 PROCEDURE — 84484 ASSAY OF TROPONIN QUANT: CPT | Mod: ER | Performed by: EMERGENCY MEDICINE

## 2023-09-25 PROCEDURE — 85025 COMPLETE CBC W/AUTO DIFF WBC: CPT | Mod: ER | Performed by: EMERGENCY MEDICINE

## 2023-09-25 PROCEDURE — 93010 ELECTROCARDIOGRAM REPORT: CPT | Mod: ,,, | Performed by: INTERNAL MEDICINE

## 2023-09-25 PROCEDURE — 99285 EMERGENCY DEPT VISIT HI MDM: CPT | Mod: 25,ER

## 2023-09-25 PROCEDURE — 25500020 PHARM REV CODE 255: Mod: ER | Performed by: EMERGENCY MEDICINE

## 2023-09-25 PROCEDURE — 85610 PROTHROMBIN TIME: CPT | Mod: ER | Performed by: EMERGENCY MEDICINE

## 2023-09-25 PROCEDURE — 83880 ASSAY OF NATRIURETIC PEPTIDE: CPT | Mod: ER | Performed by: EMERGENCY MEDICINE

## 2023-09-25 PROCEDURE — 80053 COMPREHEN METABOLIC PANEL: CPT | Mod: ER | Performed by: EMERGENCY MEDICINE

## 2023-09-25 PROCEDURE — 93005 ELECTROCARDIOGRAM TRACING: CPT | Mod: ER

## 2023-09-25 RX ORDER — ASPIRIN 325 MG
325 TABLET ORAL ONCE
Status: COMPLETED | OUTPATIENT
Start: 2023-09-25 | End: 2023-09-25

## 2023-09-25 RX ORDER — SODIUM CHLORIDE 0.9 % (FLUSH) 0.9 %
10 SYRINGE (ML) INJECTION
Status: DISCONTINUED | OUTPATIENT
Start: 2023-09-25 | End: 2023-09-26 | Stop reason: HOSPADM

## 2023-09-25 RX ORDER — ENOXAPARIN SODIUM 100 MG/ML
40 INJECTION SUBCUTANEOUS EVERY 24 HOURS
Status: DISCONTINUED | OUTPATIENT
Start: 2023-09-25 | End: 2023-09-26 | Stop reason: HOSPADM

## 2023-09-25 RX ORDER — LABETALOL HYDROCHLORIDE 5 MG/ML
10 INJECTION, SOLUTION INTRAVENOUS
Status: DISCONTINUED | OUTPATIENT
Start: 2023-09-25 | End: 2023-09-26 | Stop reason: HOSPADM

## 2023-09-25 RX ADMIN — ASPIRIN 325 MG ORAL TABLET 325 MG: 325 PILL ORAL at 10:09

## 2023-09-25 RX ADMIN — ENOXAPARIN SODIUM 40 MG: 40 INJECTION SUBCUTANEOUS at 10:09

## 2023-09-25 RX ADMIN — IOHEXOL 100 ML: 350 INJECTION, SOLUTION INTRAVENOUS at 02:09

## 2023-09-25 NOTE — ED PROVIDER NOTES
Encounter Date: 9/25/2023       History     Chief Complaint   Patient presents with    Numbness     Numbness to right arm. Onset x 1 week ago. Pt went to PCP today and was told to go to hospital for CT of the head.     89 y/o M with PMH of DM, HTN, CVA here with c/o right sided weakness and numbness. This started about 1 week ago per the patient, although he is unclear on the exact start time. Patient was seen by his PCP today, who wanted the patient to get an MRI to r/o stroke. Patient noting the numbness and weakness to right arm and leg. Denies any speech difficulty, vision changes, or other complaints today.     The history is provided by the patient.     Review of patient's allergies indicates:   Allergen Reactions    Clopidogrel      Causes diarrhea     Penicillin v Hives    Penicillins     Sulfamethoxazole-trimethoprim Hives     Past Medical History:   Diagnosis Date    Diabetes mellitus     Hypertension     Stroke     Urinary retention      Past Surgical History:   Procedure Laterality Date    carotid surgery      CORONARY ARTERY BYPASS GRAFT       History reviewed. No pertinent family history.  Social History     Tobacco Use    Smoking status: Never    Smokeless tobacco: Current   Substance Use Topics    Alcohol use: Yes     Comment: occasional    Drug use: No     Review of Systems   Constitutional:  Negative for chills and fever.   HENT:  Negative for congestion and dental problem.    Eyes:  Negative for pain and visual disturbance.   Respiratory:  Negative for cough and shortness of breath.    Cardiovascular:  Negative for chest pain and palpitations.   Gastrointestinal:  Negative for abdominal pain, diarrhea, nausea and vomiting.   Genitourinary:  Negative for dysuria and flank pain.   Musculoskeletal:  Negative for back pain and neck pain.   Skin:  Negative for rash and wound.   Neurological:  Positive for weakness and numbness. Negative for headaches.       Physical Exam     Initial Vitals   BP Pulse  Resp Temp SpO2   09/25/23 1337 09/25/23 1337 09/25/23 1337 09/25/23 1340 09/25/23 1337   135/64 97 (!) 32 98.4 °F (36.9 °C) 99 %      MAP       --                Physical Exam    Constitutional: He appears well-developed and well-nourished. No distress.   HENT:   Head: Normocephalic and atraumatic.   Mouth/Throat: Oropharynx is clear and moist.   Eyes: EOM are normal. Pupils are equal, round, and reactive to light.   Neck: Neck supple.   Normal range of motion.  Cardiovascular:  Normal rate and regular rhythm.           Pulmonary/Chest: Breath sounds normal. No respiratory distress.   Abdominal: He exhibits no distension. There is no abdominal tenderness.   Musculoskeletal:         General: No tenderness. Normal range of motion.      Cervical back: Normal range of motion and neck supple.     Neurological: He is alert.   Oriented to person, place, time, and situation. 4/5 STR in RUE. 5/5 STR in LUE, LLE, RLE. Sensation intact to light touch globally. No ataxia or dysmetria.    Skin: Skin is warm and dry.   Psychiatric: He has a normal mood and affect.         ED Course   Procedures  Labs Reviewed   CBC W/ AUTO DIFFERENTIAL - Abnormal; Notable for the following components:       Result Value    RBC 4.14 (*)     Hemoglobin 12.9 (*)     Hematocrit 38.9 (*)     MCH 31.2 (*)     MPV 9.1 (*)     All other components within normal limits   COMPREHENSIVE METABOLIC PANEL - Abnormal; Notable for the following components:    Glucose 112 (*)     All other components within normal limits   PROTIME-INR   TSH   TROPONIN I   B-TYPE NATRIURETIC PEPTIDE   LIPID PANEL     EKG Readings: (Independently Interpreted)   Rate of 95 beats per minute.  Normal sinus rhythm.  Normal axis.  P.r., QRS and QTC intervals within normal limits.  No STEMI.  Nonspecific ST and T-wave abnormalities are present.     ECG Results              ECG 12 lead (Final result)  Result time 09/25/23 19:55:57      Final result by Interface, [facility] (09/25/23  19:55:57)                   Narrative:    Test Reason : I63.9,    Vent. Rate : 095 BPM     Atrial Rate : 095 BPM     P-R Int : 188 ms          QRS Dur : 090 ms      QT Int : 340 ms       P-R-T Axes : 034 000 086 degrees     QTc Int : 427 ms    Normal sinus rhythm  Nonspecific ST and T wave abnormality  Abnormal ECG  When compared with ECG of 03-SEP-2023 09:34,  No significant change was found  Confirmed by Jordan Pierce MD ([id number]) on 9/25/2023 7:55:49 PM    Referred By: [email or web address]   SELF           Confirmed By:Jordan Pierce MD                                  Imaging Results              CTA Head and Neck (xpd) (Final result)  Result time 09/25/23 15:15:18      Final result by Yuriy Frazier MD (09/25/23 15:15:18)                   Impression:      Status post right carotid endarterectomy.    Left common carotid bifurcation atherosclerosis with predominately noncalcified plaque with 60% left internal carotid artery origin stenosis.    Bilateral skull base ICA calcified plaque.  No intracranial vascular occlusion or stenosis.    Extensive atherosclerotic plaque of the aortic arch.    Advanced cervical degenerative disc disease with spinal canal stenosis at C4-5, C5-6 and C6-7.    All CT scans at this facility use dose modulation, iterative reconstruction and/or weight based dosing when appropriate to reduce radiation dose to as low as reasonably achievable.      Electronically signed by: Yuriy Frazier MD  Date:    09/25/2023  Time:    15:15               Narrative:    EXAMINATION:  CTA HEAD AND NECK (XPD)    CLINICAL HISTORY:  Stroke/TIA, determine embolic source;    TECHNIQUE:  Standard contrast enhanced CTA of neck and brain with 100 mL Omnipaque 350 contrast with 3D MIP reformations.    COMPARISON:  CT and MRI 09/25/2023.    FINDINGS:  CTA neck: There is extensive plaque of the aortic arch.  Standard great vessel anatomy is noted.  The right brachiocephalic artery reveals noncalcified and calcified  plaque.    The right common carotid bifurcation reveals postoperative changes suggestive of endarterectomy.  No residual or recurrent stenosis.  There is calcified plaque of the distal right ICA subjacent to the skull base.    The left common carotid artery is patent.  The left bifurcation reveals extensive predominately noncalcified plaque.  The residual internal carotid artery lumen is 2 mm.  The distal lumen is 4.6 mm.  Which is consistent with a 60% stenosis.  The left cervical ICA is patent.  There is calcified plaque distally at the skull base.    The right vertebral artery is dominant.  The left vertebral artery is small and originates directly from the aortic arch.    There is advanced cervical degenerative disc disease with C4-5, C5-6 and C6-7 spinal stenosis.    CTA brain: There is calcified plaque of the right cavernous ICA.  The supraclinoid ICA is patent with a prominent posterior communicating artery.  The right ROBERT and branches appear normal.  The right MCA and branches appear normal.    The left skull base ICA reveals moderate calcified plaque.  Small posterior communicating artery is present.  The ROBERT is patent.  The left middle cerebral artery and branches appear normal.    In the posterior circulation the right vertebral artery is dominant.  The basilar artery is patent.  The posterior cerebral arteries are patent with the right orbit predominantly originating from the posterior communicating artery.    NASCET criteria are used for carotid artery stenosis measurements.                                       MRI Brain Without Contrast (Final result)  Result time 09/25/23 15:03:55      Final result by Yuriy Frazier MD (09/25/23 15:03:55)                   Impression:      Multiple small acute infarcts involving the white matter and cortical gray matter of the left frontal and parietal lobes due to the vertex.  No mass effect or hemorrhagic transformation.    Mild atrophy with white matter  degeneration.  Small chronic left periventricular white matter infarcts.      Electronically signed by: Yuriy Frazier MD  Date:    09/25/2023  Time:    15:03               Narrative:    EXAMINATION:  MRI BRAIN WITHOUT CONTRAST    CLINICAL HISTORY:  Neuro deficit, acute, stroke suspected;    TECHNIQUE:  Standard multiplanar noncontrast sequences of the brain.    COMPARISON:  CT brain 09/25/2023.    FINDINGS:  The ventricles are mildly to moderately enlarged consistent with volume loss.  Mild white matter hyperintensity is present consistent with small vessel ischemic degeneration, greater in extent on the left.  In addition there are small chronic left periventricular white matter lacunar infarcts.    There are several small areas of periventricular and subcortical white matter as well as cortical gray matter restricted diffusion of the left hemisphere involving the frontal and parietal lobes near the vertex.  Findings are consistent with several small acute infarcts.  Possible watershed type infarct core small embolic type infarcts.    No mass effect.    No hemorrhagic transformation.    Intracranial vessels reveal a normal flow void.    Pituitary gland region appears normal.    No extra-axial fluid collection is seen.                                       CT Head Without Contrast (Final result)  Result time 09/25/23 13:44:08      Final result by Yuriy Frazier MD (09/25/23 13:44:08)                   Impression:      Age-related atrophy.  No acute findings.      Electronically signed by: Yuriy Frazier MD  Date:    09/25/2023  Time:    13:44               Narrative:    EXAMINATION:  CT HEAD WITHOUT CONTRAST    CLINICAL HISTORY:  Neuro deficit, acute, stroke suspected;    Neurological deficit.    TECHNIQUE:  Standard noncontrast CT of the brain.    All CT scans at this facility are performed  using dose modulation techniques as appropriate to performed exam including the following:  automated exposure  control; adjustment of mA and/or kV according to the patients size (this includes techniques or standardized protocols for targeted exams where dose is matched to indication/reason for exam: i.e. extremities or head);  iterative reconstruction technique.    COMPARISON:  None    FINDINGS:  Brain: The ventricles are moderately enlarged consistent with volume loss.  No acute edema, hemorrhage or mass effect is present.    Skull: The skullbase is intact.  Small bony protuberance is seen arising from the outer table of the right frontal bone.  A similar description was present in a report from 2016.                                       Medications   iohexoL (OMNIPAQUE 350) injection 100 mL (100 mLs Intravenous Given 9/25/23 9921)     Medical Decision Making  DDx: TIA, CVA, Neuropathy    Problems Addressed:  Stroke: acute illness or injury that poses a threat to life or bodily functions    Amount and/or Complexity of Data Reviewed  Labs: ordered.  Radiology: ordered.  ECG/medicine tests: ordered and independent interpretation performed. Decision-making details documented in ED Course.  Discussion of management or test interpretation with external provider(s): See ED course    Risk  Prescription drug management.  Decision regarding hospitalization.      Additional MDM:     NIH Stroke Scale:   Interval = baseline (upon arrival/admit)  Level of consciousness = 0 - alert  LOC questions = 0 - answers both correctly  LOC commands = 0 - performs both correctly  Best gaze = 0 - normal  Visual = 0 - no visual loss  Facial palsy = 0 - normal  Motor left arm =  0 - no drift  Motor right arm =  0 - no drift (Decreased  strength.)  Motor left leg = 0 - no drift  Motor right leg =  0 - no drift  Limb ataxia = 0 - absent  Sensory = 0 - normal  Best language = 0 - no aphasia  Dysarthria = 0 - normal articulation  Extinction and inattention = 0 - no neglect  NIH Stroke Scale Total = 0              ED Course as of 09/25/23 2056   Mon  Sep 25, 2023   1633 Asking to go to [facility], but they are on system divert due to capacity issues. Patient now requesting [facility] as accepting facility.  [BA]   1823 [facility] is on divert as well.  [BA]   1829 All historical, clinical, radiographic, and laboratory findings were reviewed with the patient/family in detail along with the indications for transport to the facility in [place] in order to receive hospitalist and neurology evaluation.  All remaining questions and concerns were addressed at this time and the patient/family communicates understanding and agrees to proceed accordingly.  Similarly, all pertinent details of the encounter were discussed with Dr. Carballo (via Dr. Hernandez) who agrees to receive the patient at [facility] for further care as outlined above.  The patient will be transferred by [facility] ambulance services secondary to a need for ongoing telemetry monitoring en route.  Maurice Rios MD  6:29 PM       [BA]      ED Course User Index  [BA] Maurice Rios MD                    Clinical Impression:   Final diagnoses:  [I63.9] Stroke  [R53.1] Right sided weakness (Primary)        ED Disposition Condition    Admit Stable                Maurice Rios MD  09/25/23 2056

## 2023-09-25 NOTE — Clinical Note
"Date: 9/25/2023  Patient: Janes Nesbitt Jr.  Admitted: 9/25/2023  1:16 PM  Attending Provider: Maurice Rios MD Ellis E Martin Jr. or his authorized caregiver has made the decision for the patient to leave the emergency department against the a dvice of his attending physician. He or his authorized caregiver has been informed and understands the inherent risks, including death, disability, deterioration.  He or his authorized caregiver has decided to accept the responsibility for this decis ion. Marrero ONDINA Laz BootheDiana and all necessary parties have been advised that he may return for further evaluation or treatment. His condition at time of discharge was stable.  Janes Nesbitt Jr. had current vital signs as follows:  /64   Pulse 83    Temp 98.4 °F (36.9 °C) (Oral)   Resp (!) 26   Ht 5' 10" (1.778 m)   Wt 75 kg (165 lb 5.5 oz)   "

## 2023-09-26 VITALS
RESPIRATION RATE: 18 BRPM | TEMPERATURE: 98 F | HEART RATE: 105 BPM | BODY MASS INDEX: 23.62 KG/M2 | WEIGHT: 165 LBS | SYSTOLIC BLOOD PRESSURE: 123 MMHG | HEIGHT: 70 IN | DIASTOLIC BLOOD PRESSURE: 59 MMHG | OXYGEN SATURATION: 97 %

## 2023-09-26 PROBLEM — R47.1 DYSARTHRIA AND ANARTHRIA: Status: ACTIVE | Noted: 2023-09-26

## 2023-09-26 PROBLEM — R29.898 RIGHT LEG WEAKNESS: Status: ACTIVE | Noted: 2023-09-26

## 2023-09-26 PROBLEM — E78.5 HYPERLIPIDEMIA: Status: ACTIVE | Noted: 2023-09-26

## 2023-09-26 PROBLEM — I63.9 ACUTE CVA (CEREBROVASCULAR ACCIDENT): Status: ACTIVE | Noted: 2023-09-26

## 2023-09-26 PROBLEM — I25.10 ATHEROSCLEROSIS OF NATIVE CORONARY ARTERY OF NATIVE HEART WITHOUT ANGINA PECTORIS: Status: ACTIVE | Noted: 2018-07-10

## 2023-09-26 PROBLEM — E11.9 TYPE 2 DIABETES MELLITUS: Status: ACTIVE | Noted: 2023-09-26

## 2023-09-26 PROBLEM — I63.412 EMBOLIC STROKE INVOLVING LEFT MIDDLE CEREBRAL ARTERY: Status: ACTIVE | Noted: 2023-09-26

## 2023-09-26 LAB
ALBUMIN SERPL BCP-MCNC: 3.7 G/DL (ref 3.5–5.2)
ALP SERPL-CCNC: 57 U/L (ref 55–135)
ALT SERPL W/O P-5'-P-CCNC: 15 U/L (ref 10–44)
ANION GAP SERPL CALC-SCNC: 5 MMOL/L (ref 8–16)
AORTIC ROOT ANNULUS: 3.39 CM
ASCENDING AORTA: 3.06 CM
AST SERPL-CCNC: 16 U/L (ref 10–40)
AV INDEX (PROSTH): 0.87
AV MEAN GRADIENT: 2 MMHG
AV PEAK GRADIENT: 4 MMHG
AV REGURGITATION PRESSURE HALF TIME: 596.79 MS
AV VALVE AREA BY VELOCITY RATIO: 2.64 CM²
AV VALVE AREA: 2.77 CM²
AV VELOCITY RATIO: 0.83
BASOPHILS # BLD AUTO: 0.02 K/UL (ref 0–0.2)
BASOPHILS NFR BLD: 0.4 % (ref 0–1.9)
BILIRUB SERPL-MCNC: 0.4 MG/DL (ref 0.1–1)
BSA FOR ECHO PROCEDURE: 1.92 M2
BUN SERPL-MCNC: 18 MG/DL (ref 8–23)
CALCIUM SERPL-MCNC: 9.3 MG/DL (ref 8.7–10.5)
CHLORIDE SERPL-SCNC: 106 MMOL/L (ref 95–110)
CHOLEST SERPL-MCNC: 118 MG/DL (ref 120–199)
CHOLEST/HDLC SERPL: 3 {RATIO} (ref 2–5)
CO2 SERPL-SCNC: 28 MMOL/L (ref 23–29)
CREAT SERPL-MCNC: 0.8 MG/DL (ref 0.5–1.4)
CV ECHO LV RWT: 0.66 CM
DIFFERENTIAL METHOD: ABNORMAL
DOP CALC AO PEAK VEL: 1.01 M/S
DOP CALC AO VTI: 22.9 CM
DOP CALC LVOT AREA: 3.2 CM2
DOP CALC LVOT DIAMETER: 2.01 CM
DOP CALC LVOT PEAK VEL: 0.84 M/S
DOP CALC LVOT STROKE VOLUME: 63.43 CM3
DOP CALC RVOT PEAK VEL: 0.58 M/S
DOP CALC RVOT VTI: 14.8 CM
DOP CALCLVOT PEAK VEL VTI: 20 CM
E WAVE DECELERATION TIME: 387.21 MSEC
E/A RATIO: 0.67
E/E' RATIO: 7.75 M/S
ECHO LV POSTERIOR WALL: 1.32 CM (ref 0.6–1.1)
EOSINOPHIL # BLD AUTO: 0.2 K/UL (ref 0–0.5)
EOSINOPHIL NFR BLD: 3.7 % (ref 0–8)
ERYTHROCYTE [DISTWIDTH] IN BLOOD BY AUTOMATED COUNT: 13.4 % (ref 11.5–14.5)
EST. GFR  (NO RACE VARIABLE): >60 ML/MIN/1.73 M^2
FRACTIONAL SHORTENING: 28 % (ref 28–44)
GLUCOSE SERPL-MCNC: 108 MG/DL (ref 70–110)
HCT VFR BLD AUTO: 37.5 % (ref 40–54)
HDLC SERPL-MCNC: 39 MG/DL (ref 40–75)
HDLC SERPL: 33.1 % (ref 20–50)
HGB BLD-MCNC: 12 G/DL (ref 14–18)
IMM GRANULOCYTES # BLD AUTO: 0.01 K/UL (ref 0–0.04)
IMM GRANULOCYTES NFR BLD AUTO: 0.2 % (ref 0–0.5)
INTERVENTRICULAR SEPTUM: 1.35 CM (ref 0.6–1.1)
IVC DIAMETER: 1.5 CM
IVRT: 83.73 MSEC
LA MAJOR: 4.16 CM
LA MINOR: 4.14 CM
LA WIDTH: 2.6 CM
LDLC SERPL CALC-MCNC: 49 MG/DL (ref 63–159)
LEFT ATRIUM SIZE: 3.55 CM
LEFT ATRIUM VOLUME INDEX: 17 ML/M2
LEFT ATRIUM VOLUME: 32.56 CM3
LEFT INTERNAL DIMENSION IN SYSTOLE: 2.86 CM (ref 2.1–4)
LEFT VENTRICLE DIASTOLIC VOLUME INDEX: 36.14 ML/M2
LEFT VENTRICLE DIASTOLIC VOLUME: 69.39 ML
LEFT VENTRICLE MASS INDEX: 101 G/M2
LEFT VENTRICLE SYSTOLIC VOLUME INDEX: 16.2 ML/M2
LEFT VENTRICLE SYSTOLIC VOLUME: 31.06 ML
LEFT VENTRICULAR INTERNAL DIMENSION IN DIASTOLE: 3.99 CM (ref 3.5–6)
LEFT VENTRICULAR MASS: 193.53 G
LV LATERAL E/E' RATIO: 6.89 M/S
LV SEPTAL E/E' RATIO: 8.86 M/S
LVOT MG: 1.47 MMHG
LVOT MV: 0.56 CM/S
LYMPHOCYTES # BLD AUTO: 1.6 K/UL (ref 1–4.8)
LYMPHOCYTES NFR BLD: 32.9 % (ref 18–48)
MAGNESIUM SERPL-MCNC: 1.5 MG/DL (ref 1.6–2.6)
MCH RBC QN AUTO: 30 PG (ref 27–31)
MCHC RBC AUTO-ENTMCNC: 32 G/DL (ref 32–36)
MCV RBC AUTO: 94 FL (ref 82–98)
MONOCYTES # BLD AUTO: 0.5 K/UL (ref 0.3–1)
MONOCYTES NFR BLD: 10.8 % (ref 4–15)
MV PEAK A VEL: 0.93 M/S
MV PEAK E VEL: 0.62 M/S
NEUTROPHILS # BLD AUTO: 2.6 K/UL (ref 1.8–7.7)
NEUTROPHILS NFR BLD: 52 % (ref 38–73)
NONHDLC SERPL-MCNC: 79 MG/DL
NRBC BLD-RTO: 0 /100 WBC
PHOSPHATE SERPL-MCNC: 3.2 MG/DL (ref 2.7–4.5)
PISA AR MAX VEL: 2.44 M/S
PISA TR MAX VEL: 1.92 M/S
PLATELET # BLD AUTO: 149 K/UL (ref 150–450)
PMV BLD AUTO: 8.7 FL (ref 9.2–12.9)
POTASSIUM SERPL-SCNC: 4.4 MMOL/L (ref 3.5–5.1)
PROT SERPL-MCNC: 6.3 G/DL (ref 6–8.4)
PV MEAN GRADIENT: 1 MMHG
PV MV: 0.73 M/S
PV PEAK GRADIENT: 5 MMHG
PV PEAK VELOCITY: 1.12 M/S
RA MAJOR: 4.02 CM
RA PRESSURE ESTIMATED: 3 MMHG
RA WIDTH: 2.8 CM
RBC # BLD AUTO: 4 M/UL (ref 4.6–6.2)
RV TB RVSP: 5 MMHG
SODIUM SERPL-SCNC: 139 MMOL/L (ref 136–145)
STJ: 2.71 CM
TDI LATERAL: 0.09 M/S
TDI SEPTAL: 0.07 M/S
TDI: 0.08 M/S
TR MAX PG: 15 MMHG
TRICUSPID ANNULAR PLANE SYSTOLIC EXCURSION: 1.68 CM
TRIGL SERPL-MCNC: 150 MG/DL (ref 30–150)
TROPONIN I SERPL DL<=0.01 NG/ML-MCNC: <0.006 NG/ML (ref 0–0.03)
TV REST PULMONARY ARTERY PRESSURE: 18 MMHG
WBC # BLD AUTO: 4.93 K/UL (ref 3.9–12.7)
Z-SCORE OF LEFT VENTRICULAR DIMENSION IN END DIASTOLE: -3.06
Z-SCORE OF LEFT VENTRICULAR DIMENSION IN END SYSTOLE: -1.22

## 2023-09-26 PROCEDURE — 83735 ASSAY OF MAGNESIUM: CPT | Performed by: NURSE PRACTITIONER

## 2023-09-26 PROCEDURE — 92610 EVALUATE SWALLOWING FUNCTION: CPT

## 2023-09-26 PROCEDURE — G0427 PR INPT TELEHEALTH CON 70/>M: ICD-10-PCS | Mod: 95,G0,, | Performed by: NURSE PRACTITIONER

## 2023-09-26 PROCEDURE — 97530 THERAPEUTIC ACTIVITIES: CPT

## 2023-09-26 PROCEDURE — 97162 PT EVAL MOD COMPLEX 30 MIN: CPT

## 2023-09-26 PROCEDURE — 36415 COLL VENOUS BLD VENIPUNCTURE: CPT | Performed by: NURSE PRACTITIONER

## 2023-09-26 PROCEDURE — 84484 ASSAY OF TROPONIN QUANT: CPT | Performed by: NURSE PRACTITIONER

## 2023-09-26 PROCEDURE — 85025 COMPLETE CBC W/AUTO DIFF WBC: CPT | Performed by: NURSE PRACTITIONER

## 2023-09-26 PROCEDURE — 25000003 PHARM REV CODE 250: Performed by: NURSE PRACTITIONER

## 2023-09-26 PROCEDURE — 92523 SPEECH SOUND LANG COMPREHEN: CPT

## 2023-09-26 PROCEDURE — 80053 COMPREHEN METABOLIC PANEL: CPT | Performed by: NURSE PRACTITIONER

## 2023-09-26 PROCEDURE — 97166 OT EVAL MOD COMPLEX 45 MIN: CPT

## 2023-09-26 PROCEDURE — 97110 THERAPEUTIC EXERCISES: CPT

## 2023-09-26 PROCEDURE — G0427 INPT/ED TELECONSULT70: HCPCS | Mod: 95,G0,, | Performed by: NURSE PRACTITIONER

## 2023-09-26 PROCEDURE — 84100 ASSAY OF PHOSPHORUS: CPT | Performed by: NURSE PRACTITIONER

## 2023-09-26 RX ORDER — FINASTERIDE 5 MG/1
5 TABLET, FILM COATED ORAL DAILY
Status: DISCONTINUED | OUTPATIENT
Start: 2023-09-26 | End: 2023-09-26 | Stop reason: HOSPADM

## 2023-09-26 RX ORDER — ATORVASTATIN CALCIUM 10 MG/1
20 TABLET, FILM COATED ORAL NIGHTLY
Status: DISCONTINUED | OUTPATIENT
Start: 2023-09-26 | End: 2023-09-26 | Stop reason: HOSPADM

## 2023-09-26 RX ORDER — ASPIRIN AND DIPYRIDAMOLE 25; 200 MG/1; MG/1
1 CAPSULE, EXTENDED RELEASE ORAL 2 TIMES DAILY
Status: DISCONTINUED | OUTPATIENT
Start: 2023-09-26 | End: 2023-09-26 | Stop reason: HOSPADM

## 2023-09-26 RX ORDER — LISINOPRIL 5 MG/1
5 TABLET ORAL DAILY
Status: DISCONTINUED | OUTPATIENT
Start: 2023-09-26 | End: 2023-09-26 | Stop reason: HOSPADM

## 2023-09-26 RX ORDER — ASPIRIN 81 MG/1
81 TABLET ORAL DAILY
Status: DISCONTINUED | OUTPATIENT
Start: 2023-09-26 | End: 2023-09-26 | Stop reason: HOSPADM

## 2023-09-26 RX ORDER — TAMSULOSIN HYDROCHLORIDE 0.4 MG/1
0.4 CAPSULE ORAL DAILY
Status: DISCONTINUED | OUTPATIENT
Start: 2023-09-26 | End: 2023-09-26 | Stop reason: HOSPADM

## 2023-09-26 RX ORDER — SIMVASTATIN 40 MG/1
1 TABLET, FILM COATED ORAL NIGHTLY
COMMUNITY
Start: 2023-06-28

## 2023-09-26 RX ORDER — LATANOPROST 50 UG/ML
1 SOLUTION/ DROPS OPHTHALMIC NIGHTLY
Status: DISCONTINUED | OUTPATIENT
Start: 2023-09-26 | End: 2023-09-26 | Stop reason: HOSPADM

## 2023-09-26 RX ADMIN — FINASTERIDE 5 MG: 5 TABLET, FILM COATED ORAL at 08:09

## 2023-09-26 RX ADMIN — LISINOPRIL 5 MG: 5 TABLET ORAL at 08:09

## 2023-09-26 RX ADMIN — TAMSULOSIN HYDROCHLORIDE 0.4 MG: 0.4 CAPSULE ORAL at 08:09

## 2023-09-26 RX ADMIN — ASPIRIN 81 MG: 81 TABLET, COATED ORAL at 08:09

## 2023-09-26 RX ADMIN — METOPROLOL SUCCINATE 12.5 MG: 25 TABLET, EXTENDED RELEASE ORAL at 08:09

## 2023-09-26 NOTE — ASSESSMENT & PLAN NOTE
"Patient's FSGs are controlled on current medication regimen.  Last A1c reviewed-   Lab Results   Component Value Date    HGBA1C 6.8 (H) 09/04/2023     Most recent fingerstick glucose reviewed- No results for input(s): "POCTGLUCOSE" in the last 24 hours.  Current correctional scale  Low  Maintain anti-hyperglycemic dose as follows-   Antihyperglycemics (From admission, onward)    None        Most recent A1c measuring   Hemoglobin A1C   Date Value Ref Range Status   09/04/2023 6.8 (H) <=6.5 % Final   06/06/2023 6.7 (H) 4.8 - 5.6 % Final     Comment:              Prediabetes: 5.7 - 6.4           Diabetes: >6.4           Glycemic control for adults with diabetes: <7.0   11/21/2022 6.8 (H) 4.8 - 5.6 % Final     Comment:              Prediabetes: 5.7 - 6.4           Diabetes: >6.4           Glycemic control for adults with diabetes: <7.0     Plan:  -SSI  -Accu-checks   -Hypoglycemic protocol   -Hold oral antihyperglycemics while inpatient       "

## 2023-09-26 NOTE — ASSESSMENT & PLAN NOTE
-Stroke risk factor  -SBP < 220 can slowly reduce BP over next 49-72 hours; avoid aggressive BP lowering and sudden drops in BP in setting of ICA stenosis  -Long term goal < 130/80 - this can be achieved over the next 3-4 weeks

## 2023-09-26 NOTE — ASSESSMENT & PLAN NOTE
-Stroke risk factor  -A1c 6.8 on 9/4/2023  -Goal glucose 140-180 while hospitalized  -Encourage continued compliance with diet, activity, and medications

## 2023-09-26 NOTE — ASSESSMENT & PLAN NOTE
Antithrombotics for secondary stroke prevention: Antiplatelets: Aspirin: 81 mg daily  Aggrenox 200/25 mg BID    Statins for secondary stroke prevention and hyperlipidemia, if present:   Statins: Atorvastatin- 40 mg daily    Aggressive risk factor modification: HTN, HLD, Diet, Exercise, CAD     Rehab efforts: The patient has been evaluated by a stroke team provider and the therapy needs have been fully considered based off the presenting complaints and exam findings. The following therapy evaluations are needed: PT evaluate and treat, OT evaluate and treat, SLP evaluate and treat    Diagnostics ordered/pending: CT scan of head without contrast to asses brain parenchyma, CTA Head to assess vasculature , CTA Neck/Arch to assess vasculature, HgbA1C to assess blood glucose levels, Lipid Profile to assess cholesterol levels, MRI head without contrast to assess brain parenchyma, TTE to assess cardiac function/status , TSH to assess thyroid function    VTE prophylaxis: Enoxaparin 40 mg SQ every 24 hours  Mechanical prophylaxis: Place SCDs    BP parameters: Infarct: No intervention, SBP <220

## 2023-09-26 NOTE — PLAN OF CARE
P.T. EVAL COMPLETE.  PT CURRENTLY REQUIRES [facility] FOR BED MOBILITY, [facility] FOR TF'S AND GAIT NO AD.  P.T. RECOMMENDS INPATIENT REHAB AT D/C

## 2023-09-26 NOTE — SUBJECTIVE & OBJECTIVE
Past Medical History:   Diagnosis Date    Diabetes mellitus     Hypertension     Stroke     Urinary retention      Past Surgical History:   Procedure Laterality Date    carotid surgery      CORONARY ARTERY BYPASS GRAFT       History reviewed. No pertinent family history.  Social History     Tobacco Use    Smoking status: Never    Smokeless tobacco: Current   Substance Use Topics    Alcohol use: Yes     Comment: occasional    Drug use: No     Review of patient's allergies indicates:   Allergen Reactions    Clopidogrel      Causes diarrhea     Penicillin v Hives    Penicillins     Sulfamethoxazole-trimethoprim Hives       Medications: I have reviewed the current medication administration record.    Medications Prior to Admission   Medication Sig Dispense Refill Last Dose    aspirin (ECOTRIN) 81 MG EC tablet Take 81 mg by mouth once daily.   9/24/2023    benazepril (LOTENSIN) 5 MG tablet Take 5 mg by mouth once daily. 1/2 am and 1/2 pm   9/25/2023    clobetasoL (TEMOVATE) 0.05 % cream 1 application  2 (two) times daily.   Past Week    clonazePAM (KLONOPIN) 0.5 MG tablet Take 0.5 mg by mouth every evening.   9/25/2023    dipyridamole-aspirin 200-25 mg (AGGRENOX)  mg CM12 Take 1 capsule by mouth 2 (two) times daily.   9/24/2023    finasteride (PROSCAR) 5 mg tablet Take 1 tablet by mouth once daily.   9/25/2023    latanoprost 0.005 % ophthalmic solution 1 drop every evening.   9/25/2023    metformin (GLUCOPHAGE) 500 MG tablet Take 1,000 mg by mouth 2 (two) times daily with meals.   9/25/2023    metoprolol succinate (TOPROL-XL) 25 MG 24 hr tablet Take 12.5 mg by mouth once daily.   9/24/2023    simvastatin (ZOCOR) 40 MG tablet Take 1 tablet by mouth every evening.   9/25/2023    tamsulosin (FLOMAX) 0.4 mg Cap Take 1 capsule by mouth.   9/24/2023    aspirin (ECOTRIN) 325 MG EC tablet Take by mouth once daily. 0.5 tab   Unknown    doxycycline (MONODOX) 100 MG capsule Take 100 mg by mouth 2 (two) times daily.    Unknown       Review of Systems   Constitutional:  Negative for activity change and fever.   HENT:  Negative for drooling and trouble swallowing.    Eyes:  Negative for photophobia and visual disturbance.   Respiratory:  Negative for cough and shortness of breath.    Cardiovascular:  Negative for chest pain and palpitations.   Gastrointestinal:  Negative for abdominal pain, diarrhea, nausea and vomiting.   Genitourinary:  Negative for difficulty urinating and dysuria.   Musculoskeletal:  Negative for gait problem.   Skin:  Negative for color change.   Neurological:  Positive for weakness and numbness. Negative for dizziness, facial asymmetry, speech difficulty, light-headedness and headaches.   Psychiatric/Behavioral:  Negative for agitation and confusion.      Objective:     Vital Signs (Most Recent):  Temp: 97.3 °F (36.3 °C) (09/26/23 0737)  Pulse: 78 (09/26/23 0926)  Resp: 16 (09/26/23 0737)  BP: 136/69 (09/26/23 0737)  SpO2: 95 % (09/26/23 0737)    Vital Signs Range (Last 24H):  Temp:  [97.3 °F (36.3 °C)-98.4 °F (36.9 °C)]   Pulse:  [62-97]   Resp:  [16-50]   BP: (119-166)/()   SpO2:  [91 %-100 %]        Physical Exam  Constitutional:       General: He is not in acute distress.  HENT:      Head: Normocephalic.      Right Ear: External ear normal.      Left Ear: External ear normal.      Nose: Nose normal.   Pulmonary:      Effort: Pulmonary effort is normal. No respiratory distress.   Abdominal:      General: There is no distension.      Tenderness: There is no guarding.   Musculoskeletal:      Right lower leg: No edema.      Left lower leg: No edema.   Skin:     General: Skin is dry.   Neurological:      Mental Status: He is alert.   Psychiatric:         Mood and Affect: Mood normal.         Behavior: Behavior normal.          Neurological Exam:   LOC: alert  Attention Span: Good   Language: No aphasia  Articulation: Dysarthria  Orientation: Person, Place, Time   Visual Fields: Full  EOM (CN III, IV, VI):  "Full/intact  Facial Sensation (CN V): Normal  Facial Movement (CN VII): Symmetric facial expression    Motor: Arm left    Full antigravity; Full power noted with nurse assistance  Leg left   Full antigravity; Full power noted with nurse assistance  Arm right    Full antigravity; Full power noted with nurse assistance  Leg right   Full antigravity; some loss of power noted with nurse assistance  Cerebellum: No evidence of appendicular or axial ataxia  Sensation: Intact to light touch      Laboratory:  CMP:   Recent Labs   Lab 23  0435   CALCIUM 9.3   ALBUMIN 3.7   PROT 6.3      K 4.4   CO2 28      BUN 18   CREATININE 0.8   ALKPHOS 57   ALT 15   AST 16   BILITOT 0.4     CBC:   Recent Labs   Lab 23  0435   WBC 4.93   RBC 4.00*   HGB 12.0*   HCT 37.5*   *   MCV 94   MCH 30.0   MCHC 32.0     Lipid Panel:   Recent Labs   Lab 23  1400   CHOL 118*   LDLCALC 49.0*   HDL 39*   TRIG 150     Coagulation:   Recent Labs   Lab 23  1400   INR 1.0     Hgb A1C: No results for input(s): "HGBA1C" in the last 168 hours.  TSH:   Recent Labs   Lab 23  1400   TSH 1.219       Diagnostic Results:      Brain imagin2023 MRI brain reviewed and independently interpreted by me.  Scattered diffusion restriction within the left MCA territory.  No acute hemorrhage.  Chronic white matter disease.    Vessel Imagin2023 CTA head and neck reviewed and independently interpreted by me.  High grade stenosis left proximal ICA. Bilateral intracranial ICA calcified plaque.    Cardiac Evaluation:   2023 TTE  EF 60-65%; no wall motion abnormality noted; no thrombus/vegetation; normal left atrium.      "

## 2023-09-26 NOTE — H&P
"[facility] Medicine  History & Physical    Patient Name: Janes Nesbitt Jr.  MRN: [id number]  Patient Class: IP- Inpatient  Admission Date: 9/25/2023  Attending Physician: Vernon Carballo MD   Primary Care Provider: Todd Tim MD         Patient information was obtained from patient, past medical records and ER records.     Subjective:     Principal Problem:Acute CVA (cerebrovascular accident)    Chief Complaint:   Chief Complaint   Patient presents with    Numbness     Numbness to right arm. Onset x 1 week ago. Pt went to PCP today and was told to go to hospital for CT of the head.        HPI: Janes Nesbitt Jr. is a 88 y.o. male with a PMH  has a past medical history of Diabetes mellitus, Hypertension, Stroke, and Urinary retention.Presents as a transfer from our [facility] facility for further evaluation/workup and neurology consult for acute CVA findings on imaging and residual symptoms. Per documentation from Dr. Tim's office in care everywhere Epic:   "Patient called the office today because he was seen by Dr. Marie () and he recommended that patient get Dr. Tim to order a head scan for a possible stroke. Patient said he is having the same symptoms that he had for a previous stroke. States the symptoms started about 1 week ago but have not gotten any better. Patient is c/o numbness in arm and unable to walk, dragging extremity. I advised patient the fastest way to get the scan done, would be to go to the ER for immediate evaluation and to f/u with our office after. Patient voiced understanding and said he will go to [facility] in [place]".    Patient still complaining of left upper extremity numbness with decreased strength, but states that his bilateral lower extremity weakness has improved and is currently at his baseline strength/functional status.  Patient denies any speech difficulty, vision changes, headache, lightheadedness, dizziness, facial droop, chest " pain, palpitations, shortness of breath, dyspnea exertion, abdominal pain common bladder/bowel complaints, or any other symptoms associated with clinical presentation.    ER workup at outside facility revealed lab work to be unremarkable.  A1c level on 09/04/2023 was 6.8%.  Lipid panel on 06/06/2023 was within normal limits.  EKG revealed normal sinus rhythm with a ventricular rate of 95 beats per minute with nonspecific STT wave abnormalities noted with a QT/QTC of 340/427.  CT of the head showed negative acute intracranial abnormality/findings.      CTA of the head and neck revealed:  1.Status post right carotid endarterectomy.  2.Left common carotid bifurcation atherosclerosis with predominately noncalcified plaque with 60% left internal carotid artery origin stenosis.  3.Bilateral skull base ICA calcified plaque.  No intracranial vascular occlusion or stenosis.  4.Extensive atherosclerotic plaque of the aortic arch.  5.Advanced cervical degenerative disc disease with spinal canal stenosis at C4-5, C5-6 and C6-7.    MRI of the brain revealed:  1.Multiple small acute infarcts involving the white matter and cortical gray matter of the left frontal and parietal lobes due to the vertex.  No mass effect or hemorrhagic transformation.  2.Mild atrophy with white matter degeneration.  Small chronic left periventricular white matter infarcts.    Hospital Medicine consulted to admit patient for further CVA workup in neurology consult in a.m..  Patient was in agreement with treatment plan.  Patient transferred to our facility.  Patient admitted under inpatient status.    PCP: Todd Tim           Past Medical History:   Diagnosis Date    Diabetes mellitus     Hypertension     Stroke     Urinary retention        Past Surgical History:   Procedure Laterality Date    carotid surgery      CORONARY ARTERY BYPASS GRAFT         Review of patient's allergies indicates:   Allergen Reactions    Clopidogrel      Causes  diarrhea     Penicillin v Hives    Penicillins     Sulfamethoxazole-trimethoprim Hives       No current facility-administered medications on file prior to encounter.     Current Outpatient Medications on File Prior to Encounter   Medication Sig    aspirin (ECOTRIN) 81 MG EC tablet Take 81 mg by mouth once daily.    benazepril (LOTENSIN) 5 MG tablet Take 5 mg by mouth once daily. 1/2 am and 1/2 pm    clobetasoL (TEMOVATE) 0.05 % cream 1 application  2 (two) times daily.    clonazePAM (KLONOPIN) 0.5 MG tablet Take 0.5 mg by mouth every evening.    dipyridamole-aspirin 200-25 mg (AGGRENOX)  mg CM12 Take 1 capsule by mouth 2 (two) times daily.    finasteride (PROSCAR) 5 mg tablet Take 1 tablet by mouth once daily.    latanoprost 0.005 % ophthalmic solution 1 drop every evening.    metformin (GLUCOPHAGE) 500 MG tablet Take 1,000 mg by mouth 2 (two) times daily with meals.    metoprolol succinate (TOPROL-XL) 25 MG 24 hr tablet Take 12.5 mg by mouth once daily.    simvastatin (ZOCOR) 40 MG tablet Take 1 tablet by mouth every evening.    tamsulosin (FLOMAX) 0.4 mg Cap Take 1 capsule by mouth.    [DISCONTINUED] SIMVASTATIN ORAL Take 1 tablet by mouth once daily.    aspirin (ECOTRIN) 325 MG EC tablet Take by mouth once daily. 0.5 tab    doxycycline (MONODOX) 100 MG capsule Take 100 mg by mouth 2 (two) times daily.     Family History    None       Tobacco Use    Smoking status: Never    Smokeless tobacco: Current   Substance and Sexual Activity    Alcohol use: Yes     Comment: occasional    Drug use: No    Sexual activity: Not on file     Review of Systems   Eyes:  Negative for photophobia and visual disturbance.   Musculoskeletal:  Positive for gait problem. Negative for arthralgias, myalgias, neck pain and neck stiffness.   Neurological:  Positive for weakness and numbness. Negative for dizziness, tremors, syncope, facial asymmetry, speech difficulty, light-headedness and headaches.   All other  systems reviewed and are negative.    Objective:     Vital Signs (Most Recent):  Temp: 97.6 °F (36.4 °C) (09/26/23 0008)  Pulse: 62 (09/26/23 0008)  Resp: 18 (09/26/23 0008)  BP: (!) 129/57 (09/26/23 0008)  SpO2: 95 % (09/26/23 0008) Vital Signs (24h Range):  Temp:  [97.6 °F (36.4 °C)-98.4 °F (36.9 °C)] 97.6 °F (36.4 °C)  Pulse:  [62-97] 62  Resp:  [18-50] 18  SpO2:  [91 %-100 %] 95 %  BP: (119-166)/() 129/57     Weight: 75.1 kg (165 lb 9.1 oz)  Body mass index is 23.76 kg/m².     Physical Exam  Vitals and nursing note reviewed.   Constitutional:       General: He is awake. He is not in acute distress.     Appearance: Normal appearance. He is well-developed and well-groomed. He is not ill-appearing, toxic-appearing or diaphoretic.   HENT:      Head: Normocephalic and atraumatic.      Mouth/Throat:      Mouth: Mucous membranes are moist.      Pharynx: Oropharynx is clear.   Eyes:      Extraocular Movements: Extraocular movements intact.      Conjunctiva/sclera: Conjunctivae normal.      Pupils: Pupils are equal, round, and reactive to light.      Visual Fields: Right eye visual fields normal and left eye visual fields normal.   Cardiovascular:      Rate and Rhythm: Normal rate and regular rhythm.      Pulses: Normal pulses.      Heart sounds: Normal heart sounds. No murmur heard.  Pulmonary:      Effort: Pulmonary effort is normal.      Breath sounds: Normal breath sounds.   Abdominal:      General: Bowel sounds are normal.      Palpations: Abdomen is soft.      Tenderness: There is no abdominal tenderness.   Musculoskeletal:      Cervical back: Normal range of motion and neck supple.      Comments: 5/5 strength throughout, with the exception to 4/5 strength noted in right upper extremity.   Skin:     General: Skin is warm and dry.      Capillary Refill: Capillary refill takes less than 2 seconds.   Neurological:      General: No focal deficit present.      Mental Status: He is alert and oriented to person,  place, and time. Mental status is at baseline.      GCS: GCS eye subscore is 4. GCS verbal subscore is 5. GCS motor subscore is 6.      Cranial Nerves: Cranial nerves 2-12 are intact.      Sensory: Sensation is intact.      Motor: Motor function is intact.      Coordination: Coordination is intact.      Comments: DTRs are equal, present, but diminished throughout.  Able to perform finger-to-nose and heel-to-shin test without abnormalities noted.  Able to differentiate between pinprick and soft touch throughout.   Psychiatric:         Mood and Affect: Mood normal.         Behavior: Behavior normal. Behavior is cooperative.              CRANIAL NERVES     CN III, IV, VI   Pupils are equal, round, and reactive to light.  LABS:  Recent Results (from the past 24 hour(s))   CBC W/ AUTO DIFFERENTIAL    Collection Time: 09/25/23  2:00 PM   Result Value Ref Range    WBC 5.94 3.90 - 12.70 K/uL    RBC 4.14 (L) 4.60 - 6.20 M/uL    Hemoglobin 12.9 (L) 14.0 - 18.0 g/dL    Hematocrit 38.9 (L) 40.0 - 54.0 %    MCV 94 82 - 98 fL    MCH 31.2 (H) 27.0 - 31.0 pg    MCHC 33.2 32.0 - 36.0 g/dL    RDW 13.5 11.5 - 14.5 %    Platelets 164 150 - 450 K/uL    MPV 9.1 (L) 9.2 - 12.9 fL    Immature Granulocytes 0.3 0.0 - 0.5 %    Gran # (ANC) 3.7 1.8 - 7.7 K/uL    Immature Grans (Abs) 0.02 0.00 - 0.04 K/uL    Lymph # 1.6 1.0 - 4.8 K/uL    Mono # 0.5 0.3 - 1.0 K/uL    Eos # 0.1 0.0 - 0.5 K/uL    Baso # 0.02 0.00 - 0.20 K/uL    nRBC 0 0 /100 WBC    Gran % 62.2 38.0 - 73.0 %    Lymph % 27.3 18.0 - 48.0 %    Mono % 8.2 4.0 - 15.0 %    Eosinophil % 1.7 0.0 - 8.0 %    Basophil % 0.3 0.0 - 1.9 %    Differential Method Automated    Comprehensive metabolic panel    Collection Time: 09/25/23  2:00 PM   Result Value Ref Range    Sodium 139 136 - 145 mmol/L    Potassium 4.4 3.5 - 5.1 mmol/L    Chloride 102 95 - 110 mmol/L    CO2 25 23 - 29 mmol/L    Glucose 112 (H) 70 - 110 mg/dL    BUN 22 8 - 23 mg/dL    Creatinine 0.9 0.5 - 1.4 mg/dL    Calcium 9.9 8.7 -  10.5 mg/dL    Total Protein 7.1 6.0 - 8.4 g/dL    Albumin 4.3 3.5 - 5.2 g/dL    Total Bilirubin 0.4 0.1 - 1.0 mg/dL    Alkaline Phosphatase 63 55 - 135 U/L    AST 14 10 - 40 U/L    ALT 16 10 - 44 U/L    eGFR >60.0 >60 mL/min/1.73 m^2    Anion Gap 12 8 - 16 mmol/L   Protime-INR    Collection Time: 09/25/23  2:00 PM   Result Value Ref Range    Prothrombin Time 11.0 9.0 - 12.5 sec    INR 1.0 0.8 - 1.2   TSH    Collection Time: 09/25/23  2:00 PM   Result Value Ref Range    TSH 1.219 0.400 - 4.000 uIU/mL   LDL - Lipid Panel    Collection Time: 09/25/23  2:00 PM   Result Value Ref Range    Cholesterol 118 (L) 120 - 199 mg/dL    Triglycerides 150 30 - 150 mg/dL    HDL 39 (L) 40 - 75 mg/dL    LDL Cholesterol 49.0 (L) 63.0 - 159.0 mg/dL    HDL/Cholesterol Ratio 33.1 20.0 - 50.0 %    Total Cholesterol/HDL Ratio 3.0 2.0 - 5.0    Non-HDL Cholesterol 79 mg/dL   Troponin I    Collection Time: 09/25/23  2:00 PM   Result Value Ref Range    Troponin I <0.006 0.000 - 0.026 ng/mL   B-Type natriuretic peptide    Collection Time: 09/25/23  2:00 PM   Result Value Ref Range    BNP 27 0 - 99 pg/mL   Urinalysis, Reflex to Urine Culture Urine, Clean Catch    Collection Time: 09/25/23 11:41 PM    Specimen: Urine   Result Value Ref Range    Specimen UA Urine, Clean Catch     Color, UA Yellow Yellow, Straw, Luann    Appearance, UA Clear Clear    pH, UA 7.0 5.0 - 8.0    Specific Gravity, UA 1.030 1.005 - 1.030    Protein, UA Negative Negative    Glucose, UA Negative Negative    Ketones, UA Negative Negative    Bilirubin (UA) Negative Negative    Occult Blood UA Negative Negative    Nitrite, UA Negative Negative    Urobilinogen, UA Negative <2.0 EU/dL    Leukocytes, UA 2+ (A) Negative   Urinalysis Microscopic    Collection Time: 09/25/23 11:41 PM   Result Value Ref Range    RBC, UA 0 0 - 4 /hpf    WBC, UA 10 (H) 0 - 5 /hpf    Hyaline Casts, UA 1 0-1/lpf /lpf    Microscopic Comment SEE COMMENT        RADIOLOGY  CTA Head and Neck (xpd)    Result  Date: 9/25/2023  EXAMINATION: CTA HEAD AND NECK (XPD) CLINICAL HISTORY: Stroke/TIA, determine embolic source; TECHNIQUE: Standard contrast enhanced CTA of neck and brain with 100 mL Omnipaque 350 contrast with 3D MIP reformations. COMPARISON: CT and MRI 09/25/2023. FINDINGS: CTA neck: There is extensive plaque of the aortic arch.  Standard great vessel anatomy is noted.  The right brachiocephalic artery reveals noncalcified and calcified plaque. The right common carotid bifurcation reveals postoperative changes suggestive of endarterectomy.  No residual or recurrent stenosis.  There is calcified plaque of the distal right ICA subjacent to the skull base. The left common carotid artery is patent.  The left bifurcation reveals extensive predominately noncalcified plaque.  The residual internal carotid artery lumen is 2 mm.  The distal lumen is 4.6 mm.  Which is consistent with a 60% stenosis.  The left cervical ICA is patent.  There is calcified plaque distally at the skull base. The right vertebral artery is dominant.  The left vertebral artery is small and originates directly from the aortic arch. There is advanced cervical degenerative disc disease with C4-5, C5-6 and C6-7 spinal stenosis. CTA brain: There is calcified plaque of the right cavernous ICA.  The supraclinoid ICA is patent with a prominent posterior communicating artery.  The right ROBERT and branches appear normal.  The right MCA and branches appear normal. The left skull base ICA reveals moderate calcified plaque.  Small posterior communicating artery is present.  The ROBERT is patent.  The left middle cerebral artery and branches appear normal. In the posterior circulation the right vertebral artery is dominant.  The basilar artery is patent.  The posterior cerebral arteries are patent with the right orbit predominantly originating from the posterior communicating artery. NASCET criteria are used for carotid artery stenosis measurements.     Status post  right carotid endarterectomy. Left common carotid bifurcation atherosclerosis with predominately noncalcified plaque with 60% left internal carotid artery origin stenosis. Bilateral skull base ICA calcified plaque.  No intracranial vascular occlusion or stenosis. Extensive atherosclerotic plaque of the aortic arch. Advanced cervical degenerative disc disease with spinal canal stenosis at C4-5, C5-6 and C6-7. All CT scans at this facility use dose modulation, iterative reconstruction and/or weight based dosing when appropriate to reduce radiation dose to as low as reasonably achievable. Electronically signed by: Yuriy Frazier MD Date:    09/25/2023 Time:    15:15    MRI Brain Without Contrast    Result Date: 9/25/2023  EXAMINATION: MRI BRAIN WITHOUT CONTRAST CLINICAL HISTORY: Neuro deficit, acute, stroke suspected; TECHNIQUE: Standard multiplanar noncontrast sequences of the brain. COMPARISON: CT brain 09/25/2023. FINDINGS: The ventricles are mildly to moderately enlarged consistent with volume loss.  Mild white matter hyperintensity is present consistent with small vessel ischemic degeneration, greater in extent on the left.  In addition there are small chronic left periventricular white matter lacunar infarcts. There are several small areas of periventricular and subcortical white matter as well as cortical gray matter restricted diffusion of the left hemisphere involving the frontal and parietal lobes near the vertex.  Findings are consistent with several small acute infarcts.  Possible watershed type infarct core small embolic type infarcts. No mass effect. No hemorrhagic transformation. Intracranial vessels reveal a normal flow void. Pituitary gland region appears normal. No extra-axial fluid collection is seen.     Multiple small acute infarcts involving the white matter and cortical gray matter of the left frontal and parietal lobes due to the vertex.  No mass effect or hemorrhagic transformation. Mild  atrophy with white matter degeneration.  Small chronic left periventricular white matter infarcts. Electronically signed by: Yuriy rFazier MD Date:    09/25/2023 Time:    15:03    CT Head Without Contrast    Result Date: 9/25/2023  EXAMINATION: CT HEAD WITHOUT CONTRAST CLINICAL HISTORY: Neuro deficit, acute, stroke suspected; Neurological deficit. TECHNIQUE: Standard noncontrast CT of the brain. All CT scans at this facility are performed  using dose modulation techniques as appropriate to performed exam including the following:  automated exposure control; adjustment of mA and/or kV according to the patients size (this includes techniques or standardized protocols for targeted exams where dose is matched to indication/reason for exam: i.e. extremities or head);  iterative reconstruction technique. COMPARISON: None FINDINGS: Brain: The ventricles are moderately enlarged consistent with volume loss.  No acute edema, hemorrhage or mass effect is present. Skull: The skullbase is intact.  Small bony protuberance is seen arising from the outer table of the right frontal bone.  A similar description was present in a report from 2016.     Age-related atrophy.  No acute findings. Electronically signed by: Yuriy Frazier MD Date:    09/25/2023 Time:    13:44    USV Arterial Waveforms Ankle/Arm Physio, Limited    Result Date: 9/5/2023  Right: Ankle/brachial indices are normal at rest. Pulse volume recordings demonstrate a sharp upstroke and dicrotic notch.   Left: Ankle/brachial indices are normal at rest. Pulse volume recordings demonstrate a sharp upstroke and dicrotic notch.   Conclusions: No evidence of bilateral lower extremity arterial insufficiency at rest. Recommendations: Follow clinically. Critical Findings: Told results to Dr. Fitzgerald at 5:38 pm.     MRI Lumbar Spine W WO Cont    Result Date: 9/4/2023  MRI LUMBAR SPINE W WO CONTRAST CLINICAL INDICATION: Myelopathy,  acute,  lumbar spine COMPARISON: Correlation  with CT 9/3/2023 TECHNIQUE: Multiplanar multisequence magnetic resonance imaging of the lumbar spine was performed including post gadolinium sequences. FINDINGS: Lumbar vertebral body height and alignment are preserved. There is no abnormal marrow or cord signal. The conus medullaris terminates at T12-L1. There are no foci of abnormal enhancement. Individual intervertebral levels will be described below: L1/2: Normal. L2/3: Mild broad-based posterior disc bulge. Mild bilateral neuroforaminal stenosis. L3/4: Tiny posterior annular fissure. Broad-based posterior disc bulge, bilateral ligamentum flavum hypertrophy and facet osteoarthritis. Mild bilateral neuroforaminal stenosis. L4/5: Posterior annular fissure. Broad-based posterior disc bulge, bilateral ligamentum flavum hypertrophy and facet osteoarthritis. Moderate central canal stenosis, narrowing of the left greater than right lateral recesses. Severe right and moderate left neuroforaminal stenosis. L5/S1: Broad-based posterior disc bulge, posterior annular fissure. Bilateral facet osteoarthritis. Mild right and moderate left neuroforaminal stenosis.    Multilevel degenerative changes as above    CT Lumbar Spine Without Contrast    Result Date: 9/3/2023  CT LUMBAR SPINE WO CONTRAST INDICATION:  pain intermittent lower extremity weakness COMPARISON: none TECHNIQUE: Automated exposure control was utilized.  CT of the lumbar spine was performed without IV contrast. DISCUSSION: Alignment is satisfactory. The vertebral body heights are well-preserved. There is at least mild spinal canal stenosis at L3/L4 and L4/L5 related to degenerative disc disease and facet osteoarthritis. There is an aortobiiliac stent graft. Incidental note is made of cholelithiasis.    Degenerative change without acute osseous abnormality.    X-Ray Chest AP Portable    Result Date: 9/3/2023  EXAMINATION: XR CHEST AP PORTABLE CLINICAL HISTORY: Coronary artery disease., Generalized weakness;  COMPARISON: 09/20/2022 x-ray. FINDINGS: Sternal wires and mediastinal clips are again noted. The heart size is nonenlarged Small left suprahilar granuloma again noted. No acute infiltrate.     No acute findings. Electronically signed by: Yuriy Frazier MD Date:    09/03/2023 Time:    10:04      EKG    MICROBIOLOGY    MDM     Amount and/or Complexity of Data Reviewed  Clinical lab tests: reviewed  Tests in the radiology section of CPT®: reviewed  Tests in the medicine section of CPT®: reviewed  Discussion of test results with the performing providers: yes  Decide to obtain previous medical records or to obtain history from someone other than the patient: yes  Obtain history from someone other than the patient: yes  Review and summarize past medical records: yes  Discuss the patient with other providers: yes  Independent visualization of images, tracings, or specimens: yes          Assessment/Plan:     * Acute CVA (cerebrovascular accident)    Antithrombotics for secondary stroke prevention: Antiplatelets: Aspirin: 81 mg daily  Aggrenox 200/25 mg BID    Statins for secondary stroke prevention and hyperlipidemia, if present:   Statins: Atorvastatin- 40 mg daily    Aggressive risk factor modification: HTN, HLD, Diet, Exercise, CAD     Rehab efforts: The patient has been evaluated by a stroke team provider and the therapy needs have been fully considered based off the presenting complaints and exam findings. The following therapy evaluations are needed: PT evaluate and treat, OT evaluate and treat, SLP evaluate and treat    Diagnostics ordered/pending: CT scan of head without contrast to asses brain parenchyma, CTA Head to assess vasculature , CTA Neck/Arch to assess vasculature, HgbA1C to assess blood glucose levels, Lipid Profile to assess cholesterol levels, MRI head without contrast to assess brain parenchyma, TTE to assess cardiac function/status , TSH to assess thyroid function    VTE prophylaxis: Enoxaparin 40  "mg SQ every 24 hours  Mechanical prophylaxis: Place SCDs    BP parameters: Infarct: No intervention, SBP <220        Essential (primary) hypertension  Current bp mildly elevated.  BP usually well controlled per patient with home medications.  Plan:  -Optimize pain control   -Continue home medications (lotensin, toprol), titrate as needed   -Monitor BP  -Low salt/cardiac diet when not NPO  -IV hydralazine prn for SBP>160 or DBP>90           Hyperlipidemia  Patient is chronically on statin.will continue for now. Last Lipid Panel:   Lab Results   Component Value Date    CHOL 118 (L) 09/25/2023    HDL 39 (L) 09/25/2023    LDLCALC 49.0 (L) 09/25/2023    TRIG 150 09/25/2023    CHOLHDL 33.1 09/25/2023     Plan:  -Continue home medication  -low fat/low calorie diet        S/P CABG x 3  Patient with known CAD s/p CABG, which is controlled Will continue aggrenox, ASA and Statin and monitor for S/Sx of angina/ACS. Continue to monitor on telemetry.         Atherosclerosis of native coronary artery of native heart without angina pectoris  See above      Type 2 diabetes mellitus  Patient's FSGs are controlled on current medication regimen.  Last A1c reviewed-   Lab Results   Component Value Date    HGBA1C 6.8 (H) 09/04/2023     Most recent fingerstick glucose reviewed- No results for input(s): "POCTGLUCOSE" in the last 24 hours.  Current correctional scale  Low  Maintain anti-hyperglycemic dose as follows-   Antihyperglycemics (From admission, onward)    None        Most recent A1c measuring   Hemoglobin A1C   Date Value Ref Range Status   09/04/2023 6.8 (H) <=6.5 % Final   06/06/2023 6.7 (H) 4.8 - 5.6 % Final     Comment:              Prediabetes: 5.7 - 6.4           Diabetes: >6.4           Glycemic control for adults with diabetes: <7.0   11/21/2022 6.8 (H) 4.8 - 5.6 % Final     Comment:              Prediabetes: 5.7 - 6.4           Diabetes: >6.4           Glycemic control for adults with diabetes: <7.0 "     Plan:  -SSI  -Accu-checks   -Hypoglycemic protocol   -Hold oral antihyperglycemics while inpatient         AAA (abdominal aortic aneurysm)  Stable. Followed by Lise Molina PA with CVT.  Plan:  -continue to monitor  -OP f/u as scheduled  -continue aggrenox and statin      VTE Risk Mitigation (From admission, onward)         Ordered     enoxaparin injection 40 mg  Daily         09/25/23 2212     IP VTE HIGH RISK PATIENT  Once         09/25/23 2212     Place sequential compression device  Until discontinued         09/25/23 2212                 //Core Measures   -DVT proph: SCDs, lovenox   -Code status Full    -Surrogate:spouse    Components of this note were documented using a voice recognition system and are subject to errors not corrected at the time the document was proof read. Please contact the author for any clarifications.       Andrew Carballo NP  Department of Hospital Medicine  [facility] Telemetry ([facility])

## 2023-09-26 NOTE — PLAN OF CARE
Discharge education and instruction reviewed with patient. Questions answered as of now. LDA's and telemetry box removed per provider order. Patient remained free from falls during shift. No discharge medications ordered. Per Dr. Mayes, a disc with imaging results and paper with results given to patient prior to discharge. Transport requested, patient discharged with personal belongings.     Problem: Adult Inpatient Plan of Care  Goal: Plan of Care Review  Outcome: Met  Goal: Patient-Specific Goal (Individualized)  Outcome: Met  Goal: Absence of Hospital-Acquired Illness or Injury  Outcome: Met  Goal: Optimal Comfort and Wellbeing  Outcome: Met  Goal: Readiness for Transition of Care  Outcome: Met     Problem: Infection  Goal: Absence of Infection Signs and Symptoms  Outcome: Met     Problem: Adjustment to Illness (Stroke, Ischemic/Transient Ischemic Attack)  Goal: Optimal Coping  Outcome: Met     Problem: Bowel Elimination Impaired (Stroke, Ischemic/Transient Ischemic Attack)  Goal: Effective Bowel Elimination  Outcome: Met     Problem: Cerebral Tissue Perfusion (Stroke, Ischemic/Transient Ischemic Attack)  Goal: Optimal Cerebral Tissue Perfusion  Outcome: Met     Problem: Cognitive Impairment (Stroke, Ischemic/Transient Ischemic Attack)  Goal: Optimal Cognitive Function  Outcome: Met     Problem: Communication Impairment (Stroke, Ischemic/Transient Ischemic Attack)  Goal: Improved Communication Skills  Outcome: Met     Problem: Functional Ability Impaired (Stroke, Ischemic/Transient Ischemic Attack)  Goal: Optimal Functional Ability  Outcome: Met     Problem: Respiratory Compromise (Stroke, Ischemic/Transient Ischemic Attack)  Goal: Effective Oxygenation and Ventilation  Outcome: Met     Problem: Sensorimotor Impairment (Stroke, Ischemic/Transient Ischemic Attack)  Goal: Improved Sensorimotor Function  Outcome: Met     Problem: Swallowing Impairment (Stroke, Ischemic/Transient Ischemic Attack)  Goal: Optimal  Eating and Swallowing without Aspiration  Outcome: Met     Problem: Urinary Elimination Impaired (Stroke, Ischemic/Transient Ischemic Attack)  Goal: Effective Urinary Elimination  Outcome: Met     Problem: Fall Injury Risk  Goal: Absence of Fall and Fall-Related Injury  Outcome: Met     Problem: Diabetes Comorbidity  Goal: Blood Glucose Level Within Targeted Range  Outcome: Met

## 2023-09-26 NOTE — ASSESSMENT & PLAN NOTE
Patient is chronically on statin.will continue for now. Last Lipid Panel:   Lab Results   Component Value Date    CHOL 118 (L) 09/25/2023    HDL 39 (L) 09/25/2023    LDLCALC 49.0 (L) 09/25/2023    TRIG 150 09/25/2023    CHOLHDL 33.1 09/25/2023     Plan:  -Continue home medication  -low fat/low calorie diet

## 2023-09-26 NOTE — PT/OT/SLP EVAL
Speech Language Pathology Evaluation  Cognitive/Bedside Swallow    Patient Name:  Janes Nesbitt Jr.   MRN:  [id number]  Admitting Diagnosis: Embolic stroke involving left middle cerebral artery    Recommendations:                  General Recommendations:  Cognitive-linguistic therapy  Diet recommendations:  Regular Diet - IDDSI Level 7, Thin liquids - IDDSI Level 0   Aspiration Precautions: Frequent oral care, HOB to 90 degrees, Remain upright 30 minutes post meal, and Standard aspiration precautions   General Precautions: Standard, aspiration  Communication strategies:  provide increased time to answer    History:     Past Medical History:   Diagnosis Date    Diabetes mellitus     Hypertension     Stroke     Urinary retention        Past Surgical History:   Procedure Laterality Date    carotid surgery      CORONARY ARTERY BYPASS GRAFT         Social History: Patient lives with his wife at home.  Independent and drives.  Retired tug .    Prior Intubation HX:  N/A    Modified Barium Swallow: N/A    MRI BRAIN WITHOUT CONTRAST     CLINICAL HISTORY:  Neuro deficit, acute, stroke suspected;     TECHNIQUE:  Standard multiplanar noncontrast sequences of the brain.     COMPARISON:  CT brain 09/25/2023.     FINDINGS:  The ventricles are mildly to moderately enlarged consistent with volume loss.  Mild white matter hyperintensity is present consistent with small vessel ischemic degeneration, greater in extent on the left.  In addition there are small chronic left periventricular white matter lacunar infarcts.     There are several small areas of periventricular and subcortical white matter as well as cortical gray matter restricted diffusion of the left hemisphere involving the frontal and parietal lobes near the vertex.  Findings are consistent with several small acute infarcts.  Possible watershed type infarct core small embolic type infarcts.     No mass effect.     No hemorrhagic transformation.      Intracranial vessels reveal a normal flow void.     Pituitary gland region appears normal.     No extra-axial fluid collection is seen.     Impression:     Multiple small acute infarcts involving the white matter and cortical gray matter of the left frontal and parietal lobes due to the vertex.  No mass effect or hemorrhagic transformation.     Mild atrophy with white matter degeneration.  Small chronic left periventricular white matter infarcts.        Electronically signed by: Yuriy Frazier MD  Date:                                            09/25/2023  Time:                                           15:03    CTA HEAD AND NECK (XPD)     CLINICAL HISTORY:  Stroke/TIA, determine embolic source;     TECHNIQUE:  Standard contrast enhanced CTA of neck and brain with 100 mL Omnipaque 350 contrast with 3D MIP reformations.     COMPARISON:  CT and MRI 09/25/2023.     FINDINGS:  CTA neck: There is extensive plaque of the aortic arch.  Standard great vessel anatomy is noted.  The right brachiocephalic artery reveals noncalcified and calcified plaque.     The right common carotid bifurcation reveals postoperative changes suggestive of endarterectomy.  No residual or recurrent stenosis.  There is calcified plaque of the distal right ICA subjacent to the skull base.     The left common carotid artery is patent.  The left bifurcation reveals extensive predominately noncalcified plaque.  The residual internal carotid artery lumen is 2 mm.  The distal lumen is 4.6 mm.  Which is consistent with a 60% stenosis.  The left cervical ICA is patent.  There is calcified plaque distally at the skull base.     The right vertebral artery is dominant.  The left vertebral artery is small and originates directly from the aortic arch.     There is advanced cervical degenerative disc disease with C4-5, C5-6 and C6-7 spinal stenosis.     CTA brain: There is calcified plaque of the right cavernous ICA.  The supraclinoid ICA is patent with a  prominent posterior communicating artery.  The right ROBERT and branches appear normal.  The right MCA and branches appear normal.     The left skull base ICA reveals moderate calcified plaque.  Small posterior communicating artery is present.  The ROBERT is patent.  The left middle cerebral artery and branches appear normal.     In the posterior circulation the right vertebral artery is dominant.  The basilar artery is patent.  The posterior cerebral arteries are patent with the right orbit predominantly originating from the posterior communicating artery.     NASCET criteria are used for carotid artery stenosis measurements.     Impression:     Status post right carotid endarterectomy.     Left common carotid bifurcation atherosclerosis with predominately noncalcified plaque with 60% left internal carotid artery origin stenosis.     Bilateral skull base ICA calcified plaque.  No intracranial vascular occlusion or stenosis.     Extensive atherosclerotic plaque of the aortic arch.     Advanced cervical degenerative disc disease with spinal canal stenosis at C4-5, C5-6 and C6-7.     All CT scans at this facility use dose modulation, iterative reconstruction and/or weight based dosing when appropriate to reduce radiation dose to as low as reasonably achievable.        Electronically signed by: Yuriy Frazier MD  Date:                                            09/25/2023  Time:                                           15:15    XR CHEST AP PORTABLE     CLINICAL HISTORY:  Coronary artery disease., Generalized weakness;     COMPARISON:  09/20/2022 x-ray.     FINDINGS:  Sternal wires and mediastinal clips are again noted.     The heart size is nonenlarged     Small left suprahilar granuloma again noted.     No acute infiltrate.     Impression:     No acute findings.        Electronically signed by: Yuriy Frazier MD  Date:                                            09/03/2023  Time:                                         "   10:04                  Prior diet: Pt consumes a regular diet. Denied dysphagia hx.    Subjective     Pt seen bedside for ST evaluation.  No c/o pain.  "I am ready to eat."  No family present.  Patient goals: to go home     Pain/Comfort:  Pain Rating 1: 0/10  Pain Rating Post-Intervention 1: 0/10  Pain Rating 2: 0/10  Pain Rating Post-Intervention 2: 0/10    Respiratory Status: Room air    Objective:     Cognitive Status:    Oriented x4.  Impaired recent memory recall.  Impaired judgment/safety awareness. Impulsive.  Denial of deficits.       Receptive Language:   Comprehension:   WFL    Pragmatics:    WFL    Expressive Language:  Verbal:    WFL in conversation, although intermittent anomia noted in conversation. Not significantly impacting communicative intent however.      Motor Speech:  Mild dysarthria    Voice:   WFL    Oral Musculature Evaluation  Oral Musculature: WFL  Dentition: upper dentures  Secretion Management: adequate  Mucosal Quality: good  Mandibular Strength and Mobility: WFL  Oral Labial Strength and Mobility: WFL  Lingual Strength and Mobility: WFL  Velar Elevation: WFL  Buccal Strength and Mobility: WFL  Volitional Cough: present, productive  Volitional Swallow: present  Voice Prior to PO Intake: present    Bedside Clinical Swallow Evaluation:     [place] Swallow Protocol:  [place] Swallow Protocol dictates patient remain NPO if fail screener (Arlener et al. 2014).  The [place] Swallow Protocol was administered. The patient was alert and provided the instructions prior to the beginning of the protocol. The patient consumed 3 oz before putting the cup down. Patient drank with consecutive swallows. Patient without overt signs or symptoms of aspiration.       Clinical Swallow Examination:   Of note, patient self-fed throughout evaluation. Patient presented with:     CONSISTENCY  NOTES   THIN (IDDSI 0) 3 oz water challenge    No overt s/s of dysphagia   PUREE (IDDSI 4/Extremely Thick)   TSP/TBSP bites of " pudding/applesauce  No overt s/s of dysphagia   SOLID (IDDSI 7/Regular) Bite of Dahlia Doone cookie    No overt s/s of dysphagia     Thickened liquids were not used in this assessment. Zi (2018) reported that thickened liquids have no sound evidence as reducing risk of pneumonia in patients with dysphagia and can cause harm by increasing risk of dehydration. It also presents an increased risk of UTI, electrolyte imbalance, constipation, fecal impaction, cognitive impairment, functional decline, and even death (Langmore, 2002; Justin, 2016).  This supports the assertion that we should confirm a patient requires thickened liquids with an instrumental swallow study prior to recommending them.  Thickened liquids are associated with risks including dehydration, increased pharyngeal residue, potential interference with medication absorption, and decreased quality of life (Jennifer, 2013). Thickened liquids are also more likely to be silently aspirated than thin liquids (Sadi et al., 2018).     References:   Jennifer CHIN (2013). Thickening agents used for dysphagia management: Effect on bioavailability of water, medication and feelings of satiety. Nutrition Journal, 12, 54. https://doi.org/10.1186/6934-9001-83-54    CORNELIUS Avitia., TOBI Hutson, DICKSON Milian, & GIUSEPPE García (2018). Cough response to aspiration in thin and thick fluids during FEES in hospitalized inpatients. International journal of language & communication disorders, 53(5), 909-918. https://doi.org/10.1111/4590-9987.04491    INTERPRETATION:  Clinical swallow evaluation (CSE) revealed oral phase characterized by lingual, labial, and buccal strength and range of motion functional for lip closure, bolus preparation and propulsion. The patient had no anterior loss of the bolus with complete closure of the lips around the utensils. No residue remained in the oral cavity following the swallow. Patient without overt clinical signs/symptoms of aspiration on any PO  trials given.  Contributing risk factors for dysphagia include cognitive deficits and impulsivity. Patient with increased risk for silent aspiration given potential sensory deficits related to acute and remote strokes.    Compensatory Strategies  Aspiration precautions    Assessment:     Janes Nesbitt Jr. is an 88 y.o. 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 See MRI.  He presents with mild dysarthria of speech, impaired recent memory recall and judgment/reasoning.  Noted impulsivity during PO intake increasing his risk of dysphagia/aspiration.  He is recommended for IDDSI 7-regular solids with IDDSI 0-thin liquids, following aspiration precautions.  Pt would benefit from IP rehab for ongoing communication/swallowing intervention.    Goals:   Pt will consume IDDSI 7-regular solids and IDDSI 0-thin liquids without incident.    Pt will improve cognitive-communicative skills to PLOF for safe return home/independently.    Plan:     Patient to be seen:  2 x/week, 3 x/week   Plan of Care expires:  10/03/23  Plan of Care reviewed with:  patient   SLP Follow-Up:  Yes       Discharge recommendations:  Discharge Facility/Level of Care Needs: rehabilitation facility   Barriers to Discharge:  None    Time Tracking:     SLP Treatment Date:   09/26/23  Speech Start Time:  1030  Speech Stop Time:  1100     Speech Total Time (min):  30 min    Billable Minutes: Eval 15 minutes  and Eval Swallow and Oral Function 15 minutes    09/26/2023

## 2023-09-26 NOTE — CONSULTS
"[facility] - Tele[facility] ([facility])  Vascular Neurology  Comprehensive Stroke Center  TeleVascular Neurology Consult Note    Inpatient consult to Telemedicine-General Neurology  Consult performed by: Kimberly Cottrell NP  Consult ordered by: Carin Mayes MD        Assessment/Plan:     Patient is a 88 y.o. year old male with:    * Embolic stroke involving left middle cerebral artery  89 y/o male with prior stroke, HTN, DM2, carotid stenosis, urinary retention now with new areas of scattered infarcts within the left MCA.  CTA showing 60% stenosis of the left ICA.  Recent Carotid US performed 6/2023 showed 20-39% stenosis of the left ICA.    Etiology likely STACEY; however, cardioembolic event also in differential.    Antithrombotics for secondary stroke prevention: Antiplatelets: Aspirin: 81 mg daily - patient refusing Plavix stating "it made me break out all over."    Statins for secondary stroke prevention and hyperlipidemia, if present:   Statins: Can continue home Simvastatin (LDL 49.0)    Aggressive risk factor modification: HTN, DM, Carotid disease     Rehab efforts: The patient has been evaluated by a stroke team provider and the therapy needs have been fully considered based off the presenting complaints and exam findings. The following therapy evaluations are needed: PT evaluate and treat, OT evaluate and treat, SLP evaluate and treat    Diagnostics ordered/pending: None     VTE prophylaxis: Enoxaparin 40 mg SQ every 24 hours    -No further inpatient stroke workup needed and patient can dispo with therapy recommendations once medically ready  -30 day event monitor with autotrigger (CV05).  Please call [phone] to notify the tech (the order does not automatically cross over).  The device will be mailed to the patient.    -Follow up with Vascular Surgery for further management of carotid stenosis and new stroke event  -Ambulatory referral to Vascular Neurology in 4-6 weeks (Consult Order " [id number])          Right leg weakness  -Due to stroke  -PT/OT to evaluate and treat    Dysarthria and anarthria  -Due to stroke  -SLP to evaluate and treat    Hyperlipidemia  -Stroke risk factor  -LDL 49.0  -Continue home simvastatin for goal < 70      Essential (primary) hypertension  -Stroke risk factor  -SBP < 220 can slowly reduce BP over next 49-72 hours; avoid aggressive BP lowering and sudden drops in BP in setting of ICA stenosis  -Long term goal < 130/80 - this can be achieved over the next 3-4 weeks      Type 2 diabetes mellitus  -Stroke risk factor  -A1c 6.8 on 9/4/2023  -Goal glucose 140-180 while hospitalized  -Encourage continued compliance with diet, activity, and medications          STROKE DOCUMENTATION     Acute Stroke Times   Symptom Onset Date: 09/18/23  Symptom Onset Time: 0800    NIH Scale:  1a. Level of Consciousness: 0-->Alert, keenly responsive  1b. LOC Questions: 0-->Answers both questions correctly  1c. LOC Commands: 0-->Performs both tasks correctly  2. Best Gaze: 0-->Normal  3. Visual: 0-->No visual loss  4. Facial Palsy: 0-->Normal symmetrical movements  5a. Motor Arm, Left: 0-->No drift, limb holds 90 (or 45) degrees for full 10 secs  5b. Motor Arm, Right: 0-->No drift, limb holds 90 (or 45) degrees for full 10 secs  6a. Motor Leg, Left: 0-->No drift, leg holds 30 degree position for full 5 secs  6b. Motor Leg, Right: 0-->No drift, leg holds 30 degree position for full 5 secs  7. Limb Ataxia: 0-->Absent  8. Sensory: 0-->Normal, no sensory loss  9. Best Language: 0-->No aphasia, normal  10. Dysarthria: 1-->Mild-to-moderate dysarthria, patient slurs at least some words and, at worst, can be understood with some difficulty  11. Extinction and Inattention (formerly Neglect): 0-->No abnormality  Total (NIH Stroke Scale): 1    Modified Kulwant Score: 0  Karen Coma Scale:    ABCD2 Score:    [id number] Score:   HAS -BLED Score:   ICH Score:   Hunt & Zazueta Classification:       Thrombolysis  "Candidate? No, Out of window - Symptom onset > 4.5 hours    Delays to Thrombolysis?  Not Applicable    Interventional Revascularization Candidate?   Is the patient eligible for mechanical endovascular reperfusion (CITLALLI)?  No; No large vessel occlusion identified on imaging     Delays to Thrombectomy? Not Applicable    Hemorrhagic change of an Ischemic Stroke: Does this patient have an ischemic stroke with hemorrhagic changes? No     Subjective:     History of Present Illness:  87 y/o male with prior stroke, HTN, DM2, carotid stenosis, urinary retention initially presented to [facility] with complaints of right arm numbness and dragging his extremity.  Symptoms have been ongoing for about 1 week.  Symptoms are similar to his prior stroke event about 5 or so years ago.  He denies residual symptoms from his prior stroke.  He denies associated vision changes, double vision, facial weakness, swallowing difficulty, HA, dizziness.  He does report associated slurred speech.  Patient denies known triggers but states "I have about 20% blockage in my left artery, they already cleaned out the right."  Patient had carotid US performed 6/2023 showing 20-39% stenosis of the left ICA.          Past Medical History:   Diagnosis Date    Diabetes mellitus     Hypertension     Stroke     Urinary retention      Past Surgical History:   Procedure Laterality Date    carotid surgery      CORONARY ARTERY BYPASS GRAFT       History reviewed. No pertinent family history.  Social History     Tobacco Use    Smoking status: Never    Smokeless tobacco: Current   Substance Use Topics    Alcohol use: Yes     Comment: occasional    Drug use: No     Review of patient's allergies indicates:   Allergen Reactions    Clopidogrel      Causes diarrhea     Penicillin v Hives    Penicillins     Sulfamethoxazole-trimethoprim Hives       Medications: I have reviewed the current medication administration record.    Medications Prior to Admission   Medication Sig " Dispense Refill Last Dose    aspirin (ECOTRIN) 81 MG EC tablet Take 81 mg by mouth once daily.   9/24/2023    benazepril (LOTENSIN) 5 MG tablet Take 5 mg by mouth once daily. 1/2 am and 1/2 pm   9/25/2023    clobetasoL (TEMOVATE) 0.05 % cream 1 application  2 (two) times daily.   Past Week    clonazePAM (KLONOPIN) 0.5 MG tablet Take 0.5 mg by mouth every evening.   9/25/2023    dipyridamole-aspirin 200-25 mg (AGGRENOX)  mg CM12 Take 1 capsule by mouth 2 (two) times daily.   9/24/2023    finasteride (PROSCAR) 5 mg tablet Take 1 tablet by mouth once daily.   9/25/2023    latanoprost 0.005 % ophthalmic solution 1 drop every evening.   9/25/2023    metformin (GLUCOPHAGE) 500 MG tablet Take 1,000 mg by mouth 2 (two) times daily with meals.   9/25/2023    metoprolol succinate (TOPROL-XL) 25 MG 24 hr tablet Take 12.5 mg by mouth once daily.   9/24/2023    simvastatin (ZOCOR) 40 MG tablet Take 1 tablet by mouth every evening.   9/25/2023    tamsulosin (FLOMAX) 0.4 mg Cap Take 1 capsule by mouth.   9/24/2023    aspirin (ECOTRIN) 325 MG EC tablet Take by mouth once daily. 0.5 tab   Unknown    doxycycline (MONODOX) 100 MG capsule Take 100 mg by mouth 2 (two) times daily.   Unknown       Review of Systems   Constitutional:  Negative for activity change and fever.   HENT:  Negative for drooling and trouble swallowing.    Eyes:  Negative for photophobia and visual disturbance.   Respiratory:  Negative for cough and shortness of breath.    Cardiovascular:  Negative for chest pain and palpitations.   Gastrointestinal:  Negative for abdominal pain, diarrhea, nausea and vomiting.   Genitourinary:  Negative for difficulty urinating and dysuria.   Musculoskeletal:  Negative for gait problem.   Skin:  Negative for color change.   Neurological:  Positive for weakness and numbness. Negative for dizziness, facial asymmetry, speech difficulty, light-headedness and headaches.   Psychiatric/Behavioral:  Negative for agitation and  confusion.      Objective:     Vital Signs (Most Recent):  Temp: 97.3 °F (36.3 °C) (09/26/23 0737)  Pulse: 78 (09/26/23 0926)  Resp: 16 (09/26/23 0737)  BP: 136/69 (09/26/23 0737)  SpO2: 95 % (09/26/23 0737)    Vital Signs Range (Last 24H):  Temp:  [97.3 °F (36.3 °C)-98.4 °F (36.9 °C)]   Pulse:  [62-97]   Resp:  [16-50]   BP: (119-166)/()   SpO2:  [91 %-100 %]        Physical Exam  Constitutional:       General: He is not in acute distress.  HENT:      Head: Normocephalic.      Right Ear: External ear normal.      Left Ear: External ear normal.      Nose: Nose normal.   Pulmonary:      Effort: Pulmonary effort is normal. No respiratory distress.   Abdominal:      General: There is no distension.      Tenderness: There is no guarding.   Musculoskeletal:      Right lower leg: No edema.      Left lower leg: No edema.   Skin:     General: Skin is dry.   Neurological:      Mental Status: He is alert.   Psychiatric:         Mood and Affect: Mood normal.         Behavior: Behavior normal.          Neurological Exam:   LOC: alert  Attention Span: Good   Language: No aphasia  Articulation: Dysarthria  Orientation: Person, Place, Time   Visual Fields: Full  EOM (CN III, IV, VI): Full/intact  Facial Sensation (CN V): Normal  Facial Movement (CN VII): Symmetric facial expression    Motor: Arm left    Full antigravity; Full power noted with nurse assistance  Leg left   Full antigravity; Full power noted with nurse assistance  Arm right    Full antigravity; Full power noted with nurse assistance  Leg right   Full antigravity; some loss of power noted with nurse assistance  Cerebellum: No evidence of appendicular or axial ataxia  Sensation: Intact to light touch      Laboratory:  CMP:   Recent Labs   Lab 09/26/23  0435   CALCIUM 9.3   ALBUMIN 3.7   PROT 6.3      K 4.4   CO2 28      BUN 18   CREATININE 0.8   ALKPHOS 57   ALT 15   AST 16   BILITOT 0.4     CBC:   Recent Labs   Lab 09/26/23 0435   WBC 4.93   RBC  "4.00*   HGB 12.0*   HCT 37.5*   *   MCV 94   MCH 30.0   MCHC 32.0     Lipid Panel:   Recent Labs   Lab 23  1400   CHOL 118*   LDLCALC 49.0*   HDL 39*   TRIG 150     Coagulation:   Recent Labs   Lab 23  1400   INR 1.0     Hgb A1C: No results for input(s): "HGBA1C" in the last 168 hours.  TSH:   Recent Labs   Lab 23  1400   TSH 1.219       Diagnostic Results:      Brain imagin2023 MRI brain reviewed and independently interpreted by me.  Scattered diffusion restriction within the left MCA territory.  No acute hemorrhage.  Chronic white matter disease.    Vessel Imagin2023 CTA head and neck reviewed and independently interpreted by me.  High grade stenosis left proximal ICA. Bilateral intracranial ICA calcified plaque.    Cardiac Evaluation:   2023 TTE  EF 60-65%; no wall motion abnormality noted; no thrombus/vegetation; normal left atrium.      Collaborating Physician, Dr. Smith, was available during today's encounter. Any change to plan along with cosign to appear in the EMR.    VIRTUAL TELENOTE    Chief complaint: Right sided numbness/weakness  The patient location is: [place]  Present with the patient at the time of the telemed/virtual assessment: Nurse     The attending portion of this evaluation, treatment, and documentation was performed per Kimberly Cottrell NP via Telemedicine AudioVisual using the secure [email or web address] software platform with 2 way audio/video. The provider was located off-site and the patient is located in the hospital. The aforementioned video software was utilized to document the relevant history and physical exam.      Kimberly Cottrell NP  Comprehensive Stroke Center  Department of Vascular Neurology   [facility] - Telemetry ([facility])   "

## 2023-09-26 NOTE — NURSING
Patient transferred to room 241 per [facility] ambulance. Head to toe assessment complete, vital signs stable. No acute distress noted at this time. Will continue to monitor.

## 2023-09-26 NOTE — ASSESSMENT & PLAN NOTE
Patient with known CAD s/p CABG, which is controlled Will continue aggrenox, ASA and Statin and monitor for S/Sx of angina/ACS. Continue to monitor on telemetry.

## 2023-09-26 NOTE — CONSULTS
[facility] - Tele[facility] (Hospital)  Initial Discharge Assessment       Primary Care Provider: Todd Tim MD    Admission Diagnosis: Stroke [I63.9]  Right sided weakness [R53.1]    Admission Date: 9/25/2023  Expected Discharge Date:     Transition of Care Barriers: None    Payor: MEDICARE / Plan: MEDICARE PART A & B / Product Type: Government /     Extended Emergency Contact Information  Primary Emergency Contact: Jessie Nesbitt   [place]  Mobile Phone: [phone]  Relation: Spouse    Discharge Plan A: Home, Home with family  Discharge Plan B: Home      [facility] - [place], [place] - [address] [address]  [address] [place] [address]  Phone: [phone] Fax: [phone]      Initial Assessment (most recent)       Adult Discharge Assessment - 09/26/23 1103          Discharge Assessment    Assessment Type Discharge Planning Assessment     Confirmed/corrected address, phone number and insurance Yes     Confirmed Demographics Correct on Facesheet     Source of Information patient     Communicated SHAY with patient/caregiver Date not available/Unable to determine     Reason For Admission Acute CVA     People in Home spouse     Facility Arrived From: Home     Do you expect to return to your current living situation? Yes     Do you have help at home or someone to help you manage your care at home? Yes     Who are your caregiver(s) and their phone number(s)? Pts spouse     Prior to hospitilization cognitive status: Alert/Oriented     Current cognitive status: Alert/Oriented     Equipment Currently Used at Home none     Readmission within 30 days? No     Patient currently being followed by outpatient case management? No     Do you currently have service(s) that help you manage your care at home? No     Do you take prescription medications? Yes     Do you have prescription coverage? Yes     Do you have any problems affording any of your prescribed medications? No     Is the patient taking  medications as prescribed? yes     Who is going to help you get home at discharge? Pt's brother in law     How do you get to doctors appointments? car, drives self;family or friend will provide     Are you on dialysis? No     Do you take coumadin? No     DME Needed Upon Discharge  none     Discharge Plan discussed with: Patient     Transition of Care Barriers None     Discharge Plan A Home;Home with family     Discharge Plan B Home                   SW met with patient at bedside to complete discharge assessment. Pt reports living at home with spouse. Pt reports independence with care at home. Pt does not use DME at home. Pt reports that he drove himself to [facility]. Pt's brother in law will be transportation home.     SW discussed PT/OT recommendations for rehab placement. Pt declined IRF placement. SW discussed home health as an alternative. Pt declined stating that he does not need therapy at all. Patient appeared firm in his decision. MD notified.    Pt's whiteboard updated to reflect CM contact and discharge disposition. SW to remain available.

## 2023-09-26 NOTE — HPI
"89 y/o male with prior stroke, HTN, DM2, carotid stenosis, urinary retention initially presented to [facility] with complaints of right arm numbness and dragging his extremity.  Symptoms have been ongoing for about 1 week.  Symptoms are similar to his prior stroke event about 5 or so years ago.  He denies residual symptoms from his prior stroke.  He denies associated vision changes, double vision, facial weakness, swallowing difficulty, HA, dizziness.  He does report associated slurred speech.  Patient denies known triggers but states "I have about 20% blockage in my left artery, they already cleaned out the right."  Patient had carotid US performed 6/2023 showing 20-39% stenosis of the left ICA.  "

## 2023-09-26 NOTE — HPI
"Janes Nesbitt Jr. is a 88 y.o. male with a PMH  has a past medical history of Diabetes mellitus, Hypertension, Stroke, and Urinary retention.Presents as a transfer from our [facility] facility for further evaluation/workup and neurology consult for acute CVA findings on imaging and residual symptoms. Per documentation from Dr. Tim's office in care everywhere Epic:   "Patient called the office today because he was seen by Dr. Marie () and he recommended that patient get Dr. Tim to order a head scan for a possible stroke. Patient said he is having the same symptoms that he had for a previous stroke. States the symptoms started about 1 week ago but have not gotten any better. Patient is c/o numbness in arm and unable to walk, dragging extremity. I advised patient the fastest way to get the scan done, would be to go to the ER for immediate evaluation and to f/u with our office after. Patient voiced understanding and said he will go to [facility] in [place]".    Patient still complaining of left upper extremity numbness with decreased strength, but states that his bilateral lower extremity weakness has improved and is currently at his baseline strength/functional status.  Patient denies any speech difficulty, vision changes, headache, lightheadedness, dizziness, facial droop, chest pain, palpitations, shortness of breath, dyspnea exertion, abdominal pain common bladder/bowel complaints, or any other symptoms associated with clinical presentation.    ER workup at outside facility revealed lab work to be unremarkable.  A1c level on 09/04/2023 was 6.8%.  Lipid panel on 06/06/2023 was within normal limits.  EKG revealed normal sinus rhythm with a ventricular rate of 95 beats per minute with nonspecific STT wave abnormalities noted with a QT/QTC of 340/427.  CT of the head showed negative acute intracranial abnormality/findings.      CTA of the head and neck revealed:  1.Status post right carotid " endarterectomy.  2.Left common carotid bifurcation atherosclerosis with predominately noncalcified plaque with 60% left internal carotid artery origin stenosis.  3.Bilateral skull base ICA calcified plaque.  No intracranial vascular occlusion or stenosis.  4.Extensive atherosclerotic plaque of the aortic arch.  5.Advanced cervical degenerative disc disease with spinal canal stenosis at C4-5, C5-6 and C6-7.    MRI of the brain revealed:  1.Multiple small acute infarcts involving the white matter and cortical gray matter of the left frontal and parietal lobes due to the vertex.  No mass effect or hemorrhagic transformation.  2.Mild atrophy with white matter degeneration.  Small chronic left periventricular white matter infarcts.    Hospital Medicine consulted to admit patient for further CVA workup in neurology consult in a.m..  Patient was in agreement with treatment plan.  Patient transferred to our facility.  Patient admitted under inpatient status.    PCP: Todd Tim

## 2023-09-26 NOTE — PLAN OF CARE
Problem: Adult Inpatient Plan of Care  Goal: Plan of Care Review  Outcome: Ongoing, Progressing  Goal: Patient-Specific Goal (Individualized)  Outcome: Ongoing, Progressing  Goal: Absence of Hospital-Acquired Illness or Injury  Outcome: Ongoing, Progressing  Goal: Optimal Comfort and Wellbeing  Outcome: Ongoing, Progressing  Goal: Readiness for Transition of Care  Outcome: Ongoing, Progressing     Problem: Infection  Goal: Absence of Infection Signs and Symptoms  Outcome: Ongoing, Progressing     Problem: Adjustment to Illness (Stroke, Ischemic/Transient Ischemic Attack)  Goal: Optimal Coping  Outcome: Ongoing, Progressing

## 2023-09-26 NOTE — ASSESSMENT & PLAN NOTE
"87 y/o male with prior stroke, HTN, DM2, carotid stenosis, urinary retention now with new areas of scattered infarcts within the left MCA.  CTA showing 60% stenosis of the left ICA.  Recent Carotid US performed 6/2023 showed 20-39% stenosis of the left ICA.    Etiology likely STACEY; however, cardioembolic event also in differential.    Antithrombotics for secondary stroke prevention: Antiplatelets: Aspirin: 81 mg daily - patient refusing Plavix stating "it made me break out all over."    Statins for secondary stroke prevention and hyperlipidemia, if present:   Statins: Can continue home Simvastatin (LDL 49.0)    Aggressive risk factor modification: HTN, DM, Carotid disease     Rehab efforts: The patient has been evaluated by a stroke team provider and the therapy needs have been fully considered based off the presenting complaints and exam findings. The following therapy evaluations are needed: PT evaluate and treat, OT evaluate and treat, SLP evaluate and treat    Diagnostics ordered/pending: None     VTE prophylaxis: Enoxaparin 40 mg SQ every 24 hours    -No further inpatient stroke workup needed and patient can dispo with therapy recommendations once medically ready  -30 day event monitor with autotrigger (CV05).  Please call [phone] to notify the tech (the order does not automatically cross over).  The device will be mailed to the patient.    -Follow up with Vascular Surgery for further management of carotid stenosis and new stroke event  -Ambulatory referral to Vascular Neurology in 4-6 weeks (Consult Order [id number])        "

## 2023-09-26 NOTE — ASSESSMENT & PLAN NOTE
Current bp mildly elevated.  BP usually well controlled per patient with home medications.  Plan:  -Optimize pain control   -Continue home medications (lotensin, toprol), titrate as needed   -Monitor BP  -Low salt/cardiac diet when not NPO  -IV hydralazine prn for SBP>160 or DBP>90

## 2023-09-26 NOTE — PT/OT/SLP EVAL
Physical Therapy Evaluation and Treatment    Patient Name:  Janes Nesbitt Jr.   MRN:  [id number]    Recommendations:     Discharge Recommendations: rehabilitation facility   Discharge Equipment Recommendations: to be determined by next level of care   Barriers to discharge: None    Assessment:     Janes Nesbitt Jr. is a 88 y.o. male admitted with a medical diagnosis of Embolic stroke involving left middle cerebral artery.  He presents with the following impairments/functional limitations: weakness, impaired endurance, impaired functional mobility, gait instability, impaired balance, decreased safety awareness, decreased lower extremity function, decreased coordination.    Rehab Prognosis: Good; patient would benefit from acute skilled PT services to address these deficits and reach maximum level of function.    Recent Surgery: * No surgery found *     Pt would benefit from intensive therapies in an inpatient setting with 3 hours of therapy/day. Pt's needs cannot be met at a lower level of care. Pt is motivated to progress. Rehabilitation facility recommended to return patient to PLOF, prevent future falls and decrease readmission risk.       Plan:     During this hospitalization, patient to be seen 3 x/week to address the identified rehab impairments via gait training, therapeutic activities, therapeutic exercises, neuromuscular re-education and progress toward the following goals:    Plan of Care Expires:  10/10/23    Subjective     Chief Complaint: NONE, EAGER TO WALK  Patient/Family Comments/goals:   Pain/Comfort:  Pain Rating 1: 0/10    Patients cultural, spiritual, Taoist conflicts given the current situation:      Living Environment:  PT LIVES WITH WIFE 1 STORY HOUSE NO STEPS TO ENTER, AMB INDEP COMMUNITY DISTANCES, DRIVES, RETIRED, INDEP WITH ADL'S  Prior to admission, patients level of function was INDEP.  Equipment used at home: none.  DME owned (not currently used): none.  Upon discharge, patient will  "have assistance from WIFE.    Objective:     Communicated with NURSE GOODMAN prior to session.  Patient found supine with telemetry, peripheral IV  upon PT entry to room.    General Precautions: Standard, fall, hearing impaired  Orthopedic Precautions:N/A   Braces: N/A  Respiratory Status: Room air    Exams:  Cognitive Exam:  Patient is oriented to Person, Place, Time, and Situation  Postural Exam:  Patient presented with the following abnormalities:    -       Rounded shoulders  -       R SHOULDER ELEVATED  Sensation:    -       Intact-BLE  RLE ROM: WFL  RLE Strength: GROSSLY 4-/5  LLE ROM: WFL  LLE Strength: GROSSLY 4/5    Functional Mobility:  Bed Mobility:     Rolling Left:  stand by assistance  Scooting: stand by assistance  Supine to Sit: stand by assistance  Transfers:     Sit to Stand:  contact guard assistance with no AD  Bed to Chair: contact guard assistance with  no AD  using  Step Transfer  Gait: PT AMB 50' X 3 TRIALS NO AD WITH MIN/CGA, PT PRESENTS WITH NARROW HANNAH, GUARDED SHORT STEPS, NO GROSS LOB, CUES FOR UPRIGHT POSTURE, PT C/O BLE WEAKNESS, MAY BENEFIT FROM RW USE FOR INCREASED STABILITY  Balance: GOOD SITTING BALANCE, FAIR DYNAMIC BALANCE DURING GAIT    AM-PAC 6 CLICK MOBILITY  Total Score:18     Treatment & Education:  PT EDUCATION:  - ROLE OF P.T. AND POC IN ACUTE CARE HOSPITAL SETTING  - ENCOURAGED TO INCREASE TIME OOB IN CHAIR TO TOLERANCE   - TO CONTINUE THERAPUETIC EXERCISES THROUGHOUT THE DAY TO INCREASE ACTIVITY TOLERANCE AND DECREASE RISK FOR PNEUMONIA AND BLOOD CLOTS: HIP FLEX/EXT, HIP ABD/ADD, QUAD SET, HEEL SLIDE, AP  - RISK FOR FALLS DUE TO GENERALIZED WEAKNESS, EDUCATED ON "CALL DON'T FALL", ENCOURAGED TO CALL FOR ASSISTANCE WITH ALL NEEDS SUCH AS BED<>CHAIR TRANSFERS OR TRIPS TO BATHROOM, PT AGREEABLE TO SAFETY PRECAUTIONS    Patient left up in chair with all lines intact, call button in reach, chair alarm on, and NURSE notified.    GOALS:   Multidisciplinary Problems       Physical " Therapy Goals          Problem: Physical Therapy    Goal Priority Disciplines Outcome Goal Variances Interventions   Physical Therapy Goal     PT, PT/OT      Description: LTG'S TO BE MET IN 14 DAYS (10-10-23)  PT WILL BE VIOLET FOR BED MOBILITY  PT WILL REQUIRE SPV FOR BED<>CHAIR TF'S  PT WILL  FEET WITH OR WITHOUT RW AND SPV  PT WILL INC AMPAC SCORE BY 2 POINTS TO PROGRESS GROSS FUNC MOBILITY                         History:     Past Medical History:   Diagnosis Date    Diabetes mellitus     Hypertension     Stroke     Urinary retention        Past Surgical History:   Procedure Laterality Date    carotid surgery      CORONARY ARTERY BYPASS GRAFT         Time Tracking:     PT Received On: 09/26/23  PT Start Time: 0930     PT Stop Time: 0955  PT Total Time (min): 25 min     Billable Minutes: Evaluation 15 and Therapeutic Activity 10    09/26/2023

## 2023-09-26 NOTE — PLAN OF CARE
OT lamontal completed. Bed Mobility SBA, Sup>EOB SBA, EOB> Stand SBA, Pt ambulated 50 ft x 3 trails CGA, Stand>Chair SBA.    ADLs: LBD: pt SBA with donning socks while seated EOB. Grooming: set up assist with simple grooming.    D/C Rec: IPR

## 2023-09-26 NOTE — PT/OT/SLP EVAL
"Occupational Therapy Evaluation and Treatment    Name: Janes Nesbitt Jr.  MRN: [id number]  Admitting Diagnosis: Embolic stroke involving left middle cerebral artery  Recent Surgery: * No surgery found *      Recommendations:     Discharge Recommendations: rehabilitation facility  Level of Assistance Recommended: 24 hours supervision  Discharge Equipment Recommendations: to be determined by next level of care  Barriers to discharge: None    Assessment:     Janes Nesbitt Jr. is a 88 y.o. male with a medical diagnosis of Embolic stroke involving left middle cerebral artery. He presents with performance deficits affecting function including gait instability, impaired balance, decreased ROM, decreased safety awareness, decreased upper extremity function, impaired functional mobility, impaired self care skills, weakness.     Rehab Prognosis: Good; patient would benefit from acute OT services to address these deficits and reach maximum level of function.      Plan:     Patient to be seen 2 x/week to address the above listed problems via self-care/home management, therapeutic activities, therapeutic exercises  Plan of Care Expires: 10/10/23  Plan of Care Reviewed with: patient    Subjective     Chief Complaint: None  Patient Comments/Goals: Pt stated "I am an independent man" during occupational profile interview.  Pain/Comfort:  Pain Rating 1: 0/10  Pain Rating Post-Intervention 1: 0/10    Occupational Profile:  Living Environment: Patient lives with their spouse in a single story home with no steps to enter the home.  Prior Level of Function: Prior to admission, patient was independent with his ADLs and home and community mobility.  Roles and Routines: Patient was driving and retired prior to admission.  Equipment Used at Home: none  DME owned (not currently used): none  Assistance Upon Discharge: significant other    Objective:     Communicated with pt's nurseIman, and completed a chart review via Epic prior to " "session. Patient found supine with telemetry, peripheral IV upon OT entry to room.    General Precautions: Standard, fall, aspiration   Orthopedic Precautions: N/A   Braces: N/A    Respiratory Status: Room air    Cognitive and Psychosocial Function:  Oriented to: Person, Place, Time, Situation  Follows Commands/Attention: follows two-step commands  Communication: extended time for responses needed  Memory: No Deficits noted  Safety Awareness/Insight to Disability: impaired. Verbal cues to for slow control movement required throughout session.  Mood/Affect/Coping Skills/Emotional Control: Cooperative    Hearing: Intact    Vision:   Impaired. Pt initially denied changes in vision however later stated he had noticed recently his vision " a little bit weaker"        Physical Exam:  Postural examination/scapula alignment:    -       Affected scapula elevated  Skin integrity: Visible skin intact  Hand dominance: Right        Left UE Right UE   UE Edema None noted None noted   UE ROM AROM WFL Decreased AROM observed. WFL PROM. Pt states feels like its stretching during PROM.   UE Strength Grossly 4-/5 Grossly 3+/4. Remarkable weakness with R UE when comparing L UE.    Strength Good Fair   Sensation LUE  INTACT:  WFL RUE  INTACT:  WFL   Fine Motor Coordination:  LUE  INTACT:  hand, finger to nose RUE  IMPAIRED:  Pt able to complete hand, finger to nose when crossing midline and lateral however quick to fatigue requiring verbal cues to keep elbow elevated.   Gross Motor Coordination: LUE  INTACT:  WFL RUE  IMPAIRED:  WFL, limited by fatigue and impaired functional endurance     Occupational Performance  Gait belt applied - Yes    Bed Mobility:   Rolling/Turning to Left with stand by assistance  Supine to sit from left side of bed with stand by assistance  Pt impulsive with movement and fails to steady self before moving initiating new movement. Multiple VC's to remain at EOB before standing.    Functional " Mobility/Transfers  Static Sitting EOB: stand by assistance  Static Standing Balance: contact guard assistance  Dynamic Standing Balance: contact guard assistance  Sit <> Stand Transfer with contact guard assistance with no AD  Functionality Mobility: Functional Mobility: Pt ambulated 50 ft x 3 trials CGA for increased activity tolerance with ADL completion.  Stand>Chair transfer with contact guard assistance with no AD.    Activities of Daily Living:  Grooming: set up assistance with simple grooming while seated in chair.  Lower Body Dressing: stand by assistance with donning socks while seated at EOB.    AMPAC 6 Click ADL:  AMPAC Total Score: 19    Treatment & Education:  Therapist provided facilitation and instruction of proper body mechanics, energy conservation, and fall prevention strategies during tasks listed above.  Patient educated on role of OT in acute care and goals and benefit of participation.  Patient educated on importance of OOB activities with staff member assistance and sitting OOB majority of the day.  Pt provide neuro reeducation emphasizing the importance of decreased speed of exercise with visual attention and increase of repetitive of movement for neuromotor training.   Pt completed B UE ROM therex 10 reps x 2 planes to promote and maintain ROM for functional participation in occupations.  Pt encouraged to completed B UE ROM therex HEP. Pt demonstrates agreement to POC.  Pt educated on call don't fall policy and able to identify call button.  Pt encouraged to use call button to meet needs.    Patient left up in chair with all lines intact, call button in reach, and chair alarm on.    GOALS:   Multidisciplinary Problems       Occupational Therapy Goals          Problem: Occupational Therapy    Goal Priority Disciplines Outcome Interventions   Occupational Therapy Goal     OT, PT/OT     Description: Goals to be met by: 10/10/23     Patient will increase functional independence with ADLs by  performing:    LE Dressing with Modified [place].  Step transfer with Modified [place]  Increased functional strength to B UE grossly by 1/2 MMT grades.                          History:     Past Medical History:   Diagnosis Date    Diabetes mellitus     Hypertension     Stroke     Urinary retention          Past Surgical History:   Procedure Laterality Date    carotid surgery      CORONARY ARTERY BYPASS GRAFT         Time Tracking:     OT Date of Treatment: 09/26/23  OT Start Time: 0925  OT Stop Time: 0950  OT Total Time (min): 25 min    Billable Minutes: Evaluation 15 and Therapeutic Exercise 10  GILMA Bartholomew  9/26/2023

## 2023-09-26 NOTE — ASSESSMENT & PLAN NOTE
Stable. Followed by Lise Molina PA with CVT.  Plan:  -continue to monitor  -OP f/u as scheduled  -continue aggrenox and statin

## 2023-09-26 NOTE — PLAN OF CARE
[facility] - Telemetry (Hospital)  Discharge Final Note    Primary Care Provider: Todd Tim MD    Expected Discharge Date: 9/26/2023    Final Discharge Note (most recent)       Final Note - 09/26/23 1314          Final Note    Assessment Type Final Discharge Note     Anticipated Discharge Disposition Home or Self Care     Hospital Resources/Appts/Education Provided Appointments scheduled and added to AVS;Post-Acute resouces added to AVS        Post-Acute Status    Post-Acute Authorization Placement;Home Health     Post-Acute Placement Status Patient declined/refused     Home Health Status Patient declined/refused     Discharge Delays None known at this time                   Pt to discharge home today. Pt declined recommendations for IRF. Pt also declined home health care services.   SW met with patient regarding outpatient therapy. Pt agreeable to service; pt had no preference for set up. SW sent referral to [facility] @ [phone]. Pt to receive call from [facility] regarding scheduling.    PCP follow up scheduled on AVS: Todd Tim MD @ 10/3/2023 @ 1030 AM.    No CM needs for discharge.   Important Message from Medicare             Contact Info       Your Neurologist        Next Steps: Follow up    Instructions: For re-evaluation and further treatment    Todd Tim MD   Specialty: Internal Medicine    [facility]  [address]  [address]  [place] [address]       Next Steps: Follow up on 10/3/2023    Instructions: HOSPITAL FOLLOW UP SCHEDULED AT 10:30 AM ON TUESDAY, OCT 3.    Harpreet Dong MD   Specialty: Cardiology    [address]  [address]  [place] [address]   Phone: [phone]       Next Steps: Follow up in 1 week(s)    Jace Gaffney MD   Specialty: Urology    [address]  [address]  [place] [address]   Phone: [phone]       Next Steps: Follow up in 2 week(s)     [facility] - [facility]    [address] [place] [address]   Phone: [phone]       Next Steps: Follow up    Instructions: Outpatient Physical Therapy

## 2023-09-27 ENCOUNTER — PES CALL (OUTPATIENT)
Dept: HOME HEALTH SERVICES | Facility: CLINIC | Age: 88
End: 2023-09-27
Payer: MEDICARE

## 2023-10-02 ENCOUNTER — TELEPHONE (OUTPATIENT)
Dept: PHYSICAL MEDICINE AND REHAB | Facility: CLINIC | Age: 88
End: 2023-10-02
Payer: MEDICARE

## 2023-10-02 NOTE — TELEPHONE ENCOUNTER
Reached out to patient from Stroke Core Measures Report and offered services of PMR/Dr. Richey.  Patient declined services at this time. Information was provided as well as a contact number if ever needed.  Patient verbalized understanding.  Liz Cooper RN

## 2023-10-13 NOTE — HOSPITAL COURSE
Pt is 87yo male with CVA. By the time he arrived the majority of symptoms had resolved. He was evaluated by OT/PT/ST.   Brain imagin2023 MRI brain reviewed and independently interpreted by me.  Scattered diffusion restriction within the left MCA territory.  No acute hemorrhage.  Chronic white matter disease.     Vessel Imagin2023 CTA head and neck reviewed and independently interpreted by me.  High grade stenosis left proximal ICA. Bilateral intracranial ICA calcified plaque.     Cardiac Evaluation:   2023 TTE  EF 60-65%; no wall motion abnormality noted; no thrombus/vegetation; normal left atrium.     Pt seen and examined on discharge and will have referral to outpatient therapy.

## 2023-10-13 NOTE — DISCHARGE SUMMARY
"[facility] Medicine  Discharge Summary      Patient Name: Janes Nesbitt Jr.  MRN: [id number]  [facility]: [id number]  Patient Class: IP- Inpatient  Admission Date: 9/25/2023  Hospital Length of Stay: 1 days  Discharge Date and Time: 9/26/2023  2:18 PM  Attending Physician: Lilian att. providers found   Discharging Provider: Carin Irwin MD  Primary Care Provider: Todd Tim MD    Primary Care Team: Networked reference to record PCT     HPI:   Janes Nesbitt Jr. is a 88 y.o. male with a PMH  has a past medical history of Diabetes mellitus, Hypertension, Stroke, and Urinary retention.Presents as a transfer from our [facility] for further evaluation/workup and neurology consult for acute CVA findings on imaging and residual symptoms. Per documentation from Dr. Tim's office in care everywhere Epic:   "Patient called the office today because he was seen by Dr. Marei () and he recommended that patient get Dr. Tim to order a head scan for a possible stroke. Patient said he is having the same symptoms that he had for a previous stroke. States the symptoms started about 1 week ago but have not gotten any better. Patient is c/o numbness in arm and unable to walk, dragging extremity. I advised patient the fastest way to get the scan done, would be to go to the ER for immediate evaluation and to f/u with our office after. Patient voiced understanding and said he will go to [facility] in [place]".    Patient still complaining of left upper extremity numbness with decreased strength, but states that his bilateral lower extremity weakness has improved and is currently at his baseline strength/functional status.  Patient denies any speech difficulty, vision changes, headache, lightheadedness, dizziness, facial droop, chest pain, palpitations, shortness of breath, dyspnea exertion, abdominal pain common bladder/bowel complaints, or any other symptoms associated with clinical " presentation.    ER workup at outside facility revealed lab work to be unremarkable.  A1c level on 2023 was 6.8%.  Lipid panel on 2023 was within normal limits.  EKG revealed normal sinus rhythm with a ventricular rate of 95 beats per minute with nonspecific STT wave abnormalities noted with a QT/QTC of 340/427.  CT of the head showed negative acute intracranial abnormality/findings.      CTA of the head and neck revealed:  1.Status post right carotid endarterectomy.  2.Left common carotid bifurcation atherosclerosis with predominately noncalcified plaque with 60% left internal carotid artery origin stenosis.  3.Bilateral skull base ICA calcified plaque.  No intracranial vascular occlusion or stenosis.  4.Extensive atherosclerotic plaque of the aortic arch.  5.Advanced cervical degenerative disc disease with spinal canal stenosis at C4-5, C5-6 and C6-7.    MRI of the brain revealed:  1.Multiple small acute infarcts involving the white matter and cortical gray matter of the left frontal and parietal lobes due to the vertex.  No mass effect or hemorrhagic transformation.  2.Mild atrophy with white matter degeneration.  Small chronic left periventricular white matter infarcts.    Hospital Medicine consulted to admit patient for further CVA workup in neurology consult in a.m..  Patient was in agreement with treatment plan.  Patient transferred to our facility.  Patient admitted under inpatient status.    PCP: Todd Tim           * No surgery found *      Hospital Course:   Pt is 87yo male with CVA. By the time he arrived the majority of symptoms had resolved. He was evaluated by OT/PT/ST.   Brain imagin2023 MRI brain reviewed and independently interpreted by me.  Scattered diffusion restriction within the left MCA territory.  No acute hemorrhage.  Chronic white matter disease.     Vessel Imagin2023 CTA head and neck reviewed and independently interpreted by me.  High grade stenosis  left proximal ICA. Bilateral intracranial ICA calcified plaque.     Cardiac Evaluation:   9/26/2023 TTE  EF 60-65%; no wall motion abnormality noted; no thrombus/vegetation; normal left atrium.     Pt seen and examined on discharge and will have referral to outpatient therapy.       Goals of Care Treatment Preferences:  Code Status: Full Code      Consults:   Consults (From admission, onward)        Status Ordering Provider     Inpatient consult to Social Work  Once        Provider:  (Not yet assigned)    Completed [facility]     Inpatient consult to Telemedicine-General Neurology  Once        Provider:  (Not yet assigned)    Completed [facility]     IP consult to case management/social work  Once        Provider:  (Not yet assigned)    Completed [facility]IP[facility]          Neuro  * Embolic stroke involving left middle cerebral artery    Antithrombotics for secondary stroke prevention: Antiplatelets: Aspirin: 81 mg daily  Aggrenox 200/25 mg BID    Statins for secondary stroke prevention and hyperlipidemia, if present:   Statins: Atorvastatin- 40 mg daily    Aggressive risk factor modification: HTN, HLD, Diet, Exercise, CAD     Rehab efforts: The patient has been evaluated by a stroke team provider and the therapy needs have been fully considered based off the presenting complaints and exam findings. The following therapy evaluations are needed: PT evaluate and treat, OT evaluate and treat, SLP evaluate and treat    Diagnostics ordered/pending: CT scan of head without contrast to asses brain parenchyma, CTA Head to assess vasculature , CTA Neck/Arch to assess vasculature, HgbA1C to assess blood glucose levels, Lipid Profile to assess cholesterol levels, MRI head without contrast to assess brain parenchyma, TTE to assess cardiac function/status , TSH to assess thyroid function    VTE prophylaxis: Enoxaparin 40 mg SQ every 24 hours  Mechanical prophylaxis: Place SCDs    BP parameters: Infarct: No  "intervention, SBP <220        Dysarthria and anarthria        Cardiac/Vascular  S/P CABG x 3  Patient with known CAD s/p CABG, which is controlled Will continue aggrenox, ASA and Statin and monitor for S/Sx of angina/ACS. Continue to monitor on telemetry.         Hyperlipidemia  Patient is chronically on statin.will continue for now. Last Lipid Panel:   Lab Results   Component Value Date    CHOL 118 (L) 09/25/2023    HDL 39 (L) 09/25/2023    LDLCALC 49.0 (L) 09/25/2023    TRIG 150 09/25/2023    CHOLHDL 33.1 09/25/2023     Plan:  -Continue home medication  -low fat/low calorie diet        Essential (primary) hypertension  Current bp mildly elevated.  BP usually well controlled per patient with home medications.  Plan:  -Optimize pain control   -Continue home medications (lotensin, toprol), titrate as needed   -Monitor BP  -Low salt/cardiac diet when not NPO  -IV hydralazine prn for SBP>160 or DBP>90           Atherosclerosis of native coronary artery of native heart without angina pectoris  See above      AAA (abdominal aortic aneurysm)  Stable. Followed by Lise Molina PA with CVT.  Plan:  -continue to monitor  -OP f/u as scheduled  -continue aggrenox and statin    Endocrine  Type 2 diabetes mellitus  Patient's FSGs are controlled on current medication regimen.  Last A1c reviewed-   Lab Results   Component Value Date    HGBA1C 6.8 (H) 09/04/2023     Most recent fingerstick glucose reviewed- No results for input(s): "POCTGLUCOSE" in the last 24 hours.  Current correctional scale  Low  Maintain anti-hyperglycemic dose as follows-   Antihyperglycemics (From admission, onward)    None        Most recent A1c measuring   Hemoglobin A1C   Date Value Ref Range Status   09/04/2023 6.8 (H) <=6.5 % Final   06/06/2023 6.7 (H) 4.8 - 5.6 % Final     Comment:              Prediabetes: 5.7 - 6.4           Diabetes: >6.4           Glycemic control for adults with diabetes: <7.0   11/21/2022 6.8 (H) 4.8 - 5.6 % Final     " Comment:              Prediabetes: 5.7 - 6.4           Diabetes: >6.4           Glycemic control for adults with diabetes: <7.0     Plan:  -SSI  -Accu-checks   -Hypoglycemic protocol   -Hold oral antihyperglycemics while inpatient           Final Active Diagnoses:    Diagnosis Date Noted POA    PRINCIPAL PROBLEM:  Embolic stroke involving left middle cerebral artery [I63.412] 09/26/2023 Yes    Hyperlipidemia [E78.5] 09/26/2023 Yes    Type 2 diabetes mellitus [E11.9] 09/26/2023 Yes    Dysarthria and anarthria [R47.1] 09/26/2023 Yes    Right leg weakness [R29.898] 09/26/2023 Yes    Atherosclerosis of native coronary artery of native heart without angina pectoris [I25.10] 07/10/2018 Yes    Essential (primary) hypertension [I10] 08/03/2016 Yes    AAA (abdominal aortic aneurysm) [I71.40] 08/17/2015 Yes    S/P CABG x 3 [Z95.1] 01/22/2015 Not Applicable      Problems Resolved During this Admission:       Discharged Condition: fair    Disposition: Home or Self Care    Follow Up:   Follow-up Information     Your Neurologist .    Why: For re-evaluation and further treatment           Todd Tim MD Follow up on 10/3/2023.    Specialty: Internal Medicine  Why: HOSPITAL FOLLOW UP SCHEDULED AT 10:30 AM ON TUESDAY, OCT 3.  Contact information:  [address]  [address]  [place] [address]             Harpreet Dong MD Follow up in 1 week(s).    Specialty: Cardiology  Contact information:  [address]  [address]  [place] [address]  [phone]             Jace Gaffney MD Follow up in 2 week(s).    Specialty: Urology  Contact information:  [address]  [address]  [place] [address] [phone]             [facility] - Follow up.    Why: Outpatient Physical Therapy  Contact information:  [address]  [place] [address]  [phone]                       Patient Instructions:      Ambulatory referral/consult to [facility]    Standing Status: Future   Referral Priority: Routine Referral Type: Consultation   Referral Reason: Specialty Services Required   Number of Visits Requested: 1     Ambulatory referral/consult to Physical/Occupational Therapy   Standing Status: Future   Referral Priority: Routine Referral Type: Physical Medicine   Referral Reason: Specialty Services Required   Requested Specialty: Physical Therapy   Number of Visits Requested: 1     Ambulatory referral/consult to Speech Therapy   Standing Status: Future   Referral Priority: Routine Referral Type: Speech Therapy   Referral Reason: Specialty Services Required   Requested Specialty: Speech Pathology   Number of Visits Requested: 1     Notify your health care provider if you experience any of the following:  temperature >100.4     Notify your health care provider if you experience any of the following:  persistent nausea and vomiting or diarrhea     Notify your health care provider if you experience any of the following:  severe uncontrolled pain     Notify your health care provider if you experience any of the following:  difficulty breathing or increased cough     Notify your health care provider if you experience any of the following:  severe persistent headache     Notify your health care provider if you experience any of the following:  persistent dizziness, light-headedness, or visual disturbances     Notify your health care provider if you experience any of the following:  increased confusion or weakness     Activity as tolerated   Order Comments: Pt refuses in patient rehab as recommended by neurology and therapy. He does agree to outpatient rehab will send referral to facility close to his home in [facility].       Significant Diagnostic Studies: Labs: All labs within the past 24 hours have been reviewed    Pending Diagnostic Studies:     None         Medications:  Reconciled Home Medications:      Medication List      CHANGE how you take these medications     aspirin 81 MG EC tablet  Commonly known as: ECOTRIN  Take 81 mg by mouth once daily.  What changed: Another medication with the same name was removed. Continue taking this medication, and follow the directions you see here.        CONTINUE taking these medications    benazepriL 5 MG tablet  Commonly known as: LOTENSIN  Take 5 mg by mouth once daily. 1/2 am and 1/2 pm     clobetasoL 0.05 % cream  Commonly known as: TEMOVATE  1 application  2 (two) times daily.     clonazePAM 0.5 MG tablet  Commonly known as: KlonoPIN  Take 0.5 mg by mouth every evening.     dipyridamole-aspirin 200-25 mg  mg Cm12  Commonly known as: AGGRENOX  Take 1 capsule by mouth 2 (two) times daily.     doxycycline 100 MG capsule  Commonly known as: MONODOX  Take 100 mg by mouth 2 (two) times daily.     finasteride 5 mg tablet  Commonly known as: PROSCAR  Take 1 tablet by mouth once daily.     latanoprost 0.005 % ophthalmic solution  1 drop every evening.     metFORMIN 500 MG tablet  Commonly known as: GLUCOPHAGE  Take 1,000 mg by mouth 2 (two) times daily with meals.     metoprolol succinate 25 MG 24 hr tablet  Commonly known as: TOPROL-XL  Take 12.5 mg by mouth once daily.     simvastatin 40 MG tablet  Commonly known as: ZOCOR  Take 1 tablet by mouth every evening.     tamsulosin 0.4 mg Cap  Commonly known as: FLOMAX  Take 1 capsule by mouth.            Indwelling Lines/Drains at time of discharge:   Lines/Drains/Airways     None                 Time spent on the discharge of patient: 36 minutes         Carin Irwin MD  Department of Hospital Medicine  '[place] - Telemetry ([facility])

## 2024-01-01 PROBLEM — I63.412 EMBOLIC STROKE INVOLVING LEFT MIDDLE CEREBRAL ARTERY: Status: RESOLVED | Noted: 2023-09-26 | Resolved: 2024-01-01

## 2025-08-14 ENCOUNTER — HOSPITAL ENCOUNTER (OUTPATIENT)
Dept: RADIOLOGY | Facility: HOSPITAL | Age: 89
Discharge: HOME | End: 2025-08-14
Attending: INTERNAL MEDICINE
Payer: MEDICARE

## 2025-08-14 DIAGNOSIS — G81.91 HEMIPARESIS OF RIGHT DOMINANT SIDE, UNSPECIFIED HEMIPARESIS ETIOLOGY: ICD-10-CM

## 2025-08-14 PROCEDURE — 70450 CT HEAD/BRAIN W/O DYE: CPT | Mod: 26,,, | Performed by: RADIOLOGY

## 2025-08-14 PROCEDURE — 70450 CT HEAD/BRAIN W/O DYE: CPT | Mod: TC,PO

## 2025-08-18 ENCOUNTER — HOSPITAL ENCOUNTER (OUTPATIENT)
Dept: RADIOLOGY | Facility: HOSPITAL | Age: 89
Discharge: HOME | End: 2025-08-18
Attending: INTERNAL MEDICINE
Payer: MEDICARE

## 2025-08-18 PROCEDURE — 70551 MRI BRAIN STEM W/O DYE: CPT | Mod: TC,PO

## 2025-08-18 PROCEDURE — 70551 MRI BRAIN STEM W/O DYE: CPT | Mod: 26,,, | Performed by: STUDENT IN AN ORGANIZED HEALTH CARE EDUCATION/TRAINING PROGRAM
